# Patient Record
Sex: MALE | Race: WHITE | NOT HISPANIC OR LATINO
[De-identification: names, ages, dates, MRNs, and addresses within clinical notes are randomized per-mention and may not be internally consistent; named-entity substitution may affect disease eponyms.]

---

## 2017-01-09 ENCOUNTER — RESULT CHARGE (OUTPATIENT)
Age: 67
End: 2017-01-09

## 2017-01-10 ENCOUNTER — NON-APPOINTMENT (OUTPATIENT)
Age: 67
End: 2017-01-10

## 2017-01-10 ENCOUNTER — LABORATORY RESULT (OUTPATIENT)
Age: 67
End: 2017-01-10

## 2017-01-10 ENCOUNTER — APPOINTMENT (OUTPATIENT)
Dept: INTERNAL MEDICINE | Facility: CLINIC | Age: 67
End: 2017-01-10

## 2017-01-10 VITALS — WEIGHT: 116 LBS | BODY MASS INDEX: 19.3 KG/M2 | SYSTOLIC BLOOD PRESSURE: 129 MMHG | DIASTOLIC BLOOD PRESSURE: 80 MMHG

## 2017-01-10 VITALS — WEIGHT: 216 LBS | BODY MASS INDEX: 35.94 KG/M2

## 2017-01-10 DIAGNOSIS — R35.0 FREQUENCY OF MICTURITION: ICD-10-CM

## 2017-01-21 ENCOUNTER — APPOINTMENT (OUTPATIENT)
Dept: ORTHOPEDIC SURGERY | Facility: CLINIC | Age: 67
End: 2017-01-21

## 2017-01-21 VITALS
HEART RATE: 96 BPM | HEIGHT: 65 IN | BODY MASS INDEX: 35.99 KG/M2 | DIASTOLIC BLOOD PRESSURE: 95 MMHG | SYSTOLIC BLOOD PRESSURE: 150 MMHG | WEIGHT: 216 LBS

## 2017-01-23 ENCOUNTER — CHART COPY (OUTPATIENT)
Age: 67
End: 2017-01-23

## 2017-01-24 ENCOUNTER — APPOINTMENT (OUTPATIENT)
Dept: INTERNAL MEDICINE | Facility: CLINIC | Age: 67
End: 2017-01-24

## 2017-01-24 LAB
ALBUMIN SERPL ELPH-MCNC: 4.3 G/DL
ALP BLD-CCNC: 163 U/L
ALT SERPL-CCNC: 23 U/L
ANION GAP SERPL CALC-SCNC: 15 MMOL/L
APPEARANCE: CLEAR
AST SERPL-CCNC: 16 U/L
BASOPHILS # BLD AUTO: 0.06 K/UL
BASOPHILS NFR BLD AUTO: 0.7 %
BILIRUB SERPL-MCNC: 0.5 MG/DL
BILIRUBIN URINE: NEGATIVE
BLOOD URINE: NEGATIVE
BUN SERPL-MCNC: 21 MG/DL
CALCIUM SERPL-MCNC: 9.5 MG/DL
CHLORIDE SERPL-SCNC: 106 MMOL/L
CHOLEST SERPL-MCNC: 205 MG/DL
CHOLEST/HDLC SERPL: 5.4 RATIO
CO2 SERPL-SCNC: 23 MMOL/L
COLOR: ABNORMAL
CREAT SERPL-MCNC: 1.2 MG/DL
EOSINOPHIL # BLD AUTO: 0.45 K/UL
EOSINOPHIL NFR BLD AUTO: 5.4 %
GLUCOSE QUALITATIVE U: NORMAL MG/DL
GLUCOSE SERPL-MCNC: 106 MG/DL
HBA1C MFR BLD HPLC: 5.7 %
HCT VFR BLD CALC: 46.6 %
HDLC SERPL-MCNC: 38 MG/DL
HGB BLD-MCNC: 16 G/DL
IMM GRANULOCYTES NFR BLD AUTO: 0.5 %
KETONES URINE: NEGATIVE
LDLC SERPL CALC-MCNC: 132 MG/DL
LEUKOCYTE ESTERASE URINE: NEGATIVE
LYMPHOCYTES # BLD AUTO: 1.57 K/UL
LYMPHOCYTES NFR BLD AUTO: 18.9 %
MAN DIFF?: NORMAL
MCHC RBC-ENTMCNC: 29.5 PG
MCHC RBC-ENTMCNC: 34.3 GM/DL
MCV RBC AUTO: 86 FL
MONOCYTES # BLD AUTO: 1.12 K/UL
MONOCYTES NFR BLD AUTO: 13.5 %
NEUTROPHILS # BLD AUTO: 5.05 K/UL
NEUTROPHILS NFR BLD AUTO: 61 %
NITRITE URINE: NEGATIVE
PH URINE: 5.5
PLATELET # BLD AUTO: 228 K/UL
POTASSIUM SERPL-SCNC: 4.5 MMOL/L
PROT SERPL-MCNC: 6.9 G/DL
PROTEIN URINE: NEGATIVE MG/DL
PSA FREE FLD-MCNC: 28.5 %
PSA FREE SERPL-MCNC: 1.31 NG/ML
PSA SERPL-MCNC: 4.6 NG/ML
RBC # BLD: 5.42 M/UL
RBC # FLD: 13.6 %
SODIUM SERPL-SCNC: 144 MMOL/L
SPECIFIC GRAVITY URINE: 1.03
TRIGL SERPL-MCNC: 176 MG/DL
UROBILINOGEN URINE: NORMAL MG/DL
WBC # FLD AUTO: 8.29 K/UL

## 2017-02-01 ENCOUNTER — APPOINTMENT (OUTPATIENT)
Dept: ORTHOPEDIC SURGERY | Facility: CLINIC | Age: 67
End: 2017-02-01

## 2017-02-01 VITALS
HEART RATE: 93 BPM | HEIGHT: 65 IN | DIASTOLIC BLOOD PRESSURE: 75 MMHG | BODY MASS INDEX: 35.99 KG/M2 | WEIGHT: 216 LBS | SYSTOLIC BLOOD PRESSURE: 118 MMHG

## 2017-02-15 ENCOUNTER — CHART COPY (OUTPATIENT)
Age: 67
End: 2017-02-15

## 2017-02-28 ENCOUNTER — APPOINTMENT (OUTPATIENT)
Dept: INTERNAL MEDICINE | Facility: CLINIC | Age: 67
End: 2017-02-28

## 2017-03-02 ENCOUNTER — OUTPATIENT (OUTPATIENT)
Dept: OUTPATIENT SERVICES | Facility: HOSPITAL | Age: 67
LOS: 1 days | End: 2017-03-02
Payer: MEDICARE

## 2017-03-02 VITALS
HEIGHT: 66 IN | HEART RATE: 85 BPM | RESPIRATION RATE: 14 BRPM | SYSTOLIC BLOOD PRESSURE: 140 MMHG | OXYGEN SATURATION: 95 % | DIASTOLIC BLOOD PRESSURE: 95 MMHG | WEIGHT: 220.02 LBS | TEMPERATURE: 98 F

## 2017-03-02 DIAGNOSIS — Z98.890 OTHER SPECIFIED POSTPROCEDURAL STATES: Chronic | ICD-10-CM

## 2017-03-02 DIAGNOSIS — M51.26 OTHER INTERVERTEBRAL DISC DISPLACEMENT, LUMBAR REGION: ICD-10-CM

## 2017-03-02 DIAGNOSIS — M48.06 SPINAL STENOSIS, LUMBAR REGION: ICD-10-CM

## 2017-03-02 DIAGNOSIS — Z01.818 ENCOUNTER FOR OTHER PREPROCEDURAL EXAMINATION: ICD-10-CM

## 2017-03-02 DIAGNOSIS — Z98.890 OTHER SPECIFIED POSTPROCEDURAL STATES: ICD-10-CM

## 2017-03-02 DIAGNOSIS — M41.9 SCOLIOSIS, UNSPECIFIED: ICD-10-CM

## 2017-03-02 DIAGNOSIS — Z90.89 ACQUIRED ABSENCE OF OTHER ORGANS: Chronic | ICD-10-CM

## 2017-03-02 LAB
ANION GAP SERPL CALC-SCNC: 9 MMOL/L — SIGNIFICANT CHANGE UP (ref 5–17)
APTT BLD: 30.8 SEC — SIGNIFICANT CHANGE UP (ref 27.5–37.4)
BLD GP AB SCN SERPL QL: SIGNIFICANT CHANGE UP
BUN SERPL-MCNC: 20 MG/DL — SIGNIFICANT CHANGE UP (ref 7–23)
CALCIUM SERPL-MCNC: 9.1 MG/DL — SIGNIFICANT CHANGE UP (ref 8.4–10.5)
CHLORIDE SERPL-SCNC: 109 MMOL/L — HIGH (ref 96–108)
CO2 SERPL-SCNC: 26 MMOL/L — SIGNIFICANT CHANGE UP (ref 22–31)
CREAT SERPL-MCNC: 1.03 MG/DL — SIGNIFICANT CHANGE UP (ref 0.5–1.3)
GLUCOSE SERPL-MCNC: 113 MG/DL — HIGH (ref 70–99)
HCT VFR BLD CALC: 48.4 % — SIGNIFICANT CHANGE UP (ref 39–50)
HGB BLD-MCNC: 16.6 G/DL — SIGNIFICANT CHANGE UP (ref 13–17)
INR BLD: 0.98 RATIO — SIGNIFICANT CHANGE UP (ref 0.88–1.16)
MCHC RBC-ENTMCNC: 30 PG — SIGNIFICANT CHANGE UP (ref 27–34)
MCHC RBC-ENTMCNC: 34.2 GM/DL — SIGNIFICANT CHANGE UP (ref 32–36)
MCV RBC AUTO: 87.9 FL — SIGNIFICANT CHANGE UP (ref 80–100)
PLATELET # BLD AUTO: 255 K/UL — SIGNIFICANT CHANGE UP (ref 150–400)
POTASSIUM SERPL-MCNC: 4.2 MMOL/L — SIGNIFICANT CHANGE UP (ref 3.5–5.3)
POTASSIUM SERPL-SCNC: 4.2 MMOL/L — SIGNIFICANT CHANGE UP (ref 3.5–5.3)
PROTHROM AB SERPL-ACNC: 11 SEC — SIGNIFICANT CHANGE UP (ref 10–13.1)
RBC # BLD: 5.51 M/UL — SIGNIFICANT CHANGE UP (ref 4.2–5.8)
RBC # FLD: 12.5 % — SIGNIFICANT CHANGE UP (ref 10.3–14.5)
SODIUM SERPL-SCNC: 144 MMOL/L — SIGNIFICANT CHANGE UP (ref 135–145)
WBC # BLD: 7.4 K/UL — SIGNIFICANT CHANGE UP (ref 3.8–10.5)
WBC # FLD AUTO: 7.4 K/UL — SIGNIFICANT CHANGE UP (ref 3.8–10.5)

## 2017-03-02 PROCEDURE — 86850 RBC ANTIBODY SCREEN: CPT

## 2017-03-02 PROCEDURE — 85610 PROTHROMBIN TIME: CPT

## 2017-03-02 PROCEDURE — 36415 COLL VENOUS BLD VENIPUNCTURE: CPT

## 2017-03-02 PROCEDURE — 86900 BLOOD TYPING SEROLOGIC ABO: CPT

## 2017-03-02 PROCEDURE — G0463: CPT

## 2017-03-02 PROCEDURE — 85027 COMPLETE CBC AUTOMATED: CPT

## 2017-03-02 PROCEDURE — 80048 BASIC METABOLIC PNL TOTAL CA: CPT

## 2017-03-02 PROCEDURE — 93005 ELECTROCARDIOGRAM TRACING: CPT

## 2017-03-02 PROCEDURE — 86901 BLOOD TYPING SEROLOGIC RH(D): CPT

## 2017-03-02 PROCEDURE — 93010 ELECTROCARDIOGRAM REPORT: CPT

## 2017-03-02 PROCEDURE — 85730 THROMBOPLASTIN TIME PARTIAL: CPT

## 2017-03-02 NOTE — H&P PST ADULT - PSH
History of foot surgery  unsure of laterality, >10 ya  History of knee surgery  bilateral > 20 ya  History of shoulder surgery  right, 2006  History of spinal surgery  2014  History of tonsillectomy  childhood

## 2017-03-02 NOTE — H&P PST ADULT - FAMILY HISTORY
Father  Still living? Yes, Estimated age: 81-90  Family history of heart disease, Age at diagnosis: Age Unknown

## 2017-03-02 NOTE — H&P PST ADULT - MUSCULOSKELETAL
details… detailed exam no joint warmth/no joint swelling/decreased ROM due to pain/no joint erythema/no calf tenderness/diminished strength

## 2017-03-02 NOTE — H&P PST ADULT - PMH
Chronic pain    Erectile dysfunction    Herniated lumbar intervertebral disc  L4-5  Hypertension    Obesity (BMI 30-39.9)    Scoliosis    Seasonal allergies    Urinary frequency

## 2017-03-02 NOTE — H&P PST ADULT - PROBLEM SELECTOR PLAN 1
"Transforaminal lumbar interbody fusion at L4-5 and any indicated procedures w/c-arm" on 3/9/17.  patient will obtain medical clearance.  pre op instructions were reviewed and signed.

## 2017-03-02 NOTE — H&P PST ADULT - HISTORY OF PRESENT ILLNESS
66 yo male is scheduled for "Transforaminal lumbar interbody fusion at L4-5 and any indicated procedures" on 3/9/17 with Carlo Salazar MD.  patient complains of "severe" lower back pain with right sided radiation posterior leg and weakness.  Complains of losing sensation and rates pain 10/10.  Severe pain since 8/2016 for which he has tried chiropractic and PT without relief.

## 2017-03-03 NOTE — PROVIDER CONTACT NOTE (OTHER) - ASSESSMENT
The Spine Preoperative Education packet was given to the patient in  the office on 2/1/2017 . Patient and I reviewed the information included in the packet. His question(s) were answered and the patient gave a clear understanding of the instructions. He was advised to call the office at any time with any questions or concerns.

## 2017-03-06 ENCOUNTER — APPOINTMENT (OUTPATIENT)
Dept: INTERNAL MEDICINE | Facility: CLINIC | Age: 67
End: 2017-03-06

## 2017-03-06 VITALS
DIASTOLIC BLOOD PRESSURE: 85 MMHG | WEIGHT: 226 LBS | HEART RATE: 77 BPM | SYSTOLIC BLOOD PRESSURE: 139 MMHG | BODY MASS INDEX: 37.61 KG/M2

## 2017-03-07 RX ORDER — METHYLPREDNISOLONE 4 MG/1
4 TABLET ORAL
Qty: 1 | Refills: 0 | Status: DISCONTINUED | COMMUNITY
Start: 2017-01-21 | End: 2017-03-07

## 2017-03-08 ENCOUNTER — RESULT REVIEW (OUTPATIENT)
Age: 67
End: 2017-03-08

## 2017-03-08 RX ORDER — ONDANSETRON 8 MG/1
4 TABLET, FILM COATED ORAL EVERY 6 HOURS
Qty: 0 | Refills: 0 | Status: DISCONTINUED | OUTPATIENT
Start: 2017-03-09 | End: 2017-03-11

## 2017-03-08 RX ORDER — SENNA PLUS 8.6 MG/1
2 TABLET ORAL AT BEDTIME
Qty: 0 | Refills: 0 | Status: DISCONTINUED | OUTPATIENT
Start: 2017-03-09 | End: 2017-03-11

## 2017-03-08 RX ORDER — DOCUSATE SODIUM 100 MG
100 CAPSULE ORAL THREE TIMES A DAY
Qty: 0 | Refills: 0 | Status: DISCONTINUED | OUTPATIENT
Start: 2017-03-09 | End: 2017-03-11

## 2017-03-08 RX ORDER — MAGNESIUM HYDROXIDE 400 MG/1
30 TABLET, CHEWABLE ORAL EVERY 12 HOURS
Qty: 0 | Refills: 0 | Status: DISCONTINUED | OUTPATIENT
Start: 2017-03-09 | End: 2017-03-11

## 2017-03-08 RX ORDER — SODIUM CHLORIDE 9 MG/ML
1000 INJECTION, SOLUTION INTRAVENOUS
Qty: 0 | Refills: 0 | Status: DISCONTINUED | OUTPATIENT
Start: 2017-03-09 | End: 2017-03-10

## 2017-03-08 NOTE — PATIENT PROFILE ADULT. - TEACHING/LEARNING LEARNING PREFERENCES
written material/pictorial/audio/computer/internet/skill demonstration/group instruction/video/individual instruction/verbal instruction

## 2017-03-09 ENCOUNTER — APPOINTMENT (OUTPATIENT)
Dept: ORTHOPEDIC SURGERY | Facility: HOSPITAL | Age: 67
End: 2017-03-09

## 2017-03-09 ENCOUNTER — TRANSCRIPTION ENCOUNTER (OUTPATIENT)
Age: 67
End: 2017-03-09

## 2017-03-09 ENCOUNTER — INPATIENT (INPATIENT)
Facility: HOSPITAL | Age: 67
LOS: 1 days | Discharge: HOME | DRG: 460 | End: 2017-03-11
Attending: ORTHOPAEDIC SURGERY | Admitting: ORTHOPAEDIC SURGERY
Payer: MEDICARE

## 2017-03-09 VITALS
TEMPERATURE: 98 F | HEIGHT: 66 IN | RESPIRATION RATE: 20 BRPM | SYSTOLIC BLOOD PRESSURE: 142 MMHG | WEIGHT: 220.46 LBS | DIASTOLIC BLOOD PRESSURE: 84 MMHG | HEART RATE: 91 BPM | OXYGEN SATURATION: 97 %

## 2017-03-09 DIAGNOSIS — Z98.890 OTHER SPECIFIED POSTPROCEDURAL STATES: Chronic | ICD-10-CM

## 2017-03-09 DIAGNOSIS — Z98.89 OTHER SPECIFIED POSTPROCEDURAL STATES: ICD-10-CM

## 2017-03-09 DIAGNOSIS — M48.06 SPINAL STENOSIS, LUMBAR REGION: ICD-10-CM

## 2017-03-09 DIAGNOSIS — Z90.89 ACQUIRED ABSENCE OF OTHER ORGANS: Chronic | ICD-10-CM

## 2017-03-09 DIAGNOSIS — Z98.890 OTHER SPECIFIED POSTPROCEDURAL STATES: ICD-10-CM

## 2017-03-09 LAB — ABO RH CONFIRMATION: SIGNIFICANT CHANGE UP

## 2017-03-09 PROCEDURE — 20936 SP BONE AGRFT LOCAL ADD-ON: CPT | Mod: 59

## 2017-03-09 PROCEDURE — 22859 INSJ BIOMECHANICAL DEVICE: CPT

## 2017-03-09 PROCEDURE — 22630 ARTHRD PST TQ 1NTRSPC LUM: CPT | Mod: 59

## 2017-03-09 PROCEDURE — 22630 ARTHRD PST TQ 1NTRSPC LUM: CPT | Mod: 82,59

## 2017-03-09 PROCEDURE — 22840 INSERT SPINE FIXATION DEVICE: CPT | Mod: 82

## 2017-03-09 PROCEDURE — 22840 INSERT SPINE FIXATION DEVICE: CPT

## 2017-03-09 PROCEDURE — 88304 TISSUE EXAM BY PATHOLOGIST: CPT | Mod: 26

## 2017-03-09 PROCEDURE — 22612 ARTHRD PST TQ 1NTRSPC LUMBAR: CPT

## 2017-03-09 PROCEDURE — 22612 ARTHRD PST TQ 1NTRSPC LUMBAR: CPT | Mod: 82

## 2017-03-09 PROCEDURE — 99223 1ST HOSP IP/OBS HIGH 75: CPT

## 2017-03-09 PROCEDURE — 22859 INSJ BIOMECHANICAL DEVICE: CPT | Mod: 82

## 2017-03-09 RX ORDER — INFLUENZA VIRUS VACCINE 15; 15; 15; 15 UG/.5ML; UG/.5ML; UG/.5ML; UG/.5ML
0.5 SUSPENSION INTRAMUSCULAR ONCE
Qty: 0 | Refills: 0 | Status: DISCONTINUED | OUTPATIENT
Start: 2017-03-09 | End: 2017-03-11

## 2017-03-09 RX ORDER — CEFAZOLIN SODIUM 1 G
2000 VIAL (EA) INJECTION ONCE
Qty: 0 | Refills: 0 | Status: COMPLETED | OUTPATIENT
Start: 2017-03-09 | End: 2017-03-09

## 2017-03-09 RX ORDER — AMLODIPINE BESYLATE 2.5 MG/1
5 TABLET ORAL AT BEDTIME
Qty: 0 | Refills: 0 | Status: DISCONTINUED | OUTPATIENT
Start: 2017-03-10 | End: 2017-03-11

## 2017-03-09 RX ORDER — HYDROMORPHONE HYDROCHLORIDE 2 MG/ML
0.5 INJECTION INTRAMUSCULAR; INTRAVENOUS; SUBCUTANEOUS
Qty: 0 | Refills: 0 | Status: DISCONTINUED | OUTPATIENT
Start: 2017-03-09 | End: 2017-03-11

## 2017-03-09 RX ORDER — HYDROMORPHONE HYDROCHLORIDE 2 MG/ML
0.5 INJECTION INTRAMUSCULAR; INTRAVENOUS; SUBCUTANEOUS
Qty: 0 | Refills: 0 | Status: DISCONTINUED | OUTPATIENT
Start: 2017-03-09 | End: 2017-03-09

## 2017-03-09 RX ORDER — ALFUZOSIN HYDROCHLORIDE 10 MG/1
10 TABLET, EXTENDED RELEASE ORAL AT BEDTIME
Qty: 0 | Refills: 0 | Status: DISCONTINUED | OUTPATIENT
Start: 2017-03-09 | End: 2017-03-11

## 2017-03-09 RX ORDER — LORATADINE 10 MG/1
10 TABLET ORAL DAILY
Qty: 0 | Refills: 0 | Status: DISCONTINUED | OUTPATIENT
Start: 2017-03-09 | End: 2017-03-11

## 2017-03-09 RX ORDER — DIPHENHYDRAMINE HCL 50 MG
25 CAPSULE ORAL EVERY 8 HOURS
Qty: 0 | Refills: 0 | Status: DISCONTINUED | OUTPATIENT
Start: 2017-03-09 | End: 2017-03-11

## 2017-03-09 RX ORDER — CEFAZOLIN SODIUM 1 G
2000 VIAL (EA) INJECTION EVERY 8 HOURS
Qty: 0 | Refills: 0 | Status: COMPLETED | OUTPATIENT
Start: 2017-03-09 | End: 2017-03-10

## 2017-03-09 RX ORDER — ACETAMINOPHEN 500 MG
1000 TABLET ORAL EVERY 8 HOURS
Qty: 0 | Refills: 0 | Status: DISCONTINUED | OUTPATIENT
Start: 2017-03-10 | End: 2017-03-11

## 2017-03-09 RX ORDER — ONDANSETRON 8 MG/1
4 TABLET, FILM COATED ORAL EVERY 6 HOURS
Qty: 0 | Refills: 0 | Status: DISCONTINUED | OUTPATIENT
Start: 2017-03-09 | End: 2017-03-10

## 2017-03-09 RX ORDER — LISINOPRIL 2.5 MG/1
20 TABLET ORAL DAILY
Qty: 0 | Refills: 0 | Status: DISCONTINUED | OUTPATIENT
Start: 2017-03-11 | End: 2017-03-11

## 2017-03-09 RX ORDER — ACETAMINOPHEN 500 MG
1000 TABLET ORAL EVERY 6 HOURS
Qty: 0 | Refills: 0 | Status: COMPLETED | OUTPATIENT
Start: 2017-03-09 | End: 2017-03-10

## 2017-03-09 RX ORDER — SODIUM CHLORIDE 9 MG/ML
1000 INJECTION, SOLUTION INTRAVENOUS
Qty: 0 | Refills: 0 | Status: DISCONTINUED | OUTPATIENT
Start: 2017-03-09 | End: 2017-03-09

## 2017-03-09 RX ORDER — HYDROMORPHONE HYDROCHLORIDE 2 MG/ML
30 INJECTION INTRAMUSCULAR; INTRAVENOUS; SUBCUTANEOUS
Qty: 0 | Refills: 0 | Status: DISCONTINUED | OUTPATIENT
Start: 2017-03-09 | End: 2017-03-10

## 2017-03-09 RX ADMIN — Medication 100 MILLIGRAM(S): at 21:15

## 2017-03-09 RX ADMIN — HYDROMORPHONE HYDROCHLORIDE 0.5 MILLIGRAM(S): 2 INJECTION INTRAMUSCULAR; INTRAVENOUS; SUBCUTANEOUS at 13:19

## 2017-03-09 RX ADMIN — Medication 400 MILLIGRAM(S): at 22:27

## 2017-03-09 RX ADMIN — Medication 400 MILLIGRAM(S): at 16:51

## 2017-03-09 RX ADMIN — Medication 100 MILLIGRAM(S): at 16:16

## 2017-03-09 RX ADMIN — HYDROMORPHONE HYDROCHLORIDE 0.5 MILLIGRAM(S): 2 INJECTION INTRAMUSCULAR; INTRAVENOUS; SUBCUTANEOUS at 12:49

## 2017-03-09 RX ADMIN — Medication 1000 MILLIGRAM(S): at 23:00

## 2017-03-09 RX ADMIN — HYDROMORPHONE HYDROCHLORIDE 30 MILLILITER(S): 2 INJECTION INTRAMUSCULAR; INTRAVENOUS; SUBCUTANEOUS at 19:02

## 2017-03-09 RX ADMIN — HYDROMORPHONE HYDROCHLORIDE 30 MILLILITER(S): 2 INJECTION INTRAMUSCULAR; INTRAVENOUS; SUBCUTANEOUS at 13:21

## 2017-03-09 RX ADMIN — SENNA PLUS 2 TABLET(S): 8.6 TABLET ORAL at 21:15

## 2017-03-09 RX ADMIN — HYDROMORPHONE HYDROCHLORIDE 30 MILLILITER(S): 2 INJECTION INTRAMUSCULAR; INTRAVENOUS; SUBCUTANEOUS at 11:51

## 2017-03-09 RX ADMIN — SODIUM CHLORIDE 75 MILLILITER(S): 9 INJECTION, SOLUTION INTRAVENOUS at 11:55

## 2017-03-09 NOTE — OCCUPATIONAL THERAPY INITIAL EVALUATION ADULT - ADL RETRAINING, OT EVAL
Patient will dress lower body with set-up while maintaining spinal precautions, AE as needed within 2-3 sessions.

## 2017-03-09 NOTE — PHYSICAL THERAPY INITIAL EVALUATION ADULT - ACTIVE RANGE OF MOTION EXAMINATION, REHAB EVAL
helen. upper extremity Active ROM was WNL (within normal limits)/bilateral lower extremity Active ROM was WNL (within normal limits)

## 2017-03-09 NOTE — OCCUPATIONAL THERAPY INITIAL EVALUATION ADULT - BED MOBILITY TRAINING, PT EVAL
Pt will perform bed mobility with supervision while using log rolling technique within 2-3 sessions.

## 2017-03-09 NOTE — OCCUPATIONAL THERAPY INITIAL EVALUATION ADULT - RANGE OF MOTION EXAMINATION, UPPER EXTREMITY
Bilateral UE shld flexion  0-90(spinal precautions) elbow-digits WFL's/bilateral UE Active ROM was WFL  (within functional limits)

## 2017-03-09 NOTE — OCCUPATIONAL THERAPY INITIAL EVALUATION ADULT - TRANSFER TRAINING, PT EVAL
Patient will transfer to toilet with  supervision while maintaining spinal precautions DME as needed  2-3 sessions.

## 2017-03-10 ENCOUNTER — TRANSCRIPTION ENCOUNTER (OUTPATIENT)
Age: 67
End: 2017-03-10

## 2017-03-10 LAB
ANION GAP SERPL CALC-SCNC: 7 MMOL/L — SIGNIFICANT CHANGE UP (ref 5–17)
BASOPHILS # BLD AUTO: 0.1 K/UL — SIGNIFICANT CHANGE UP (ref 0–0.2)
BUN SERPL-MCNC: 14 MG/DL — SIGNIFICANT CHANGE UP (ref 7–23)
CALCIUM SERPL-MCNC: 8.8 MG/DL — SIGNIFICANT CHANGE UP (ref 8.4–10.5)
CHLORIDE SERPL-SCNC: 105 MMOL/L — SIGNIFICANT CHANGE UP (ref 96–108)
CO2 SERPL-SCNC: 26 MMOL/L — SIGNIFICANT CHANGE UP (ref 22–31)
CREAT SERPL-MCNC: 1.06 MG/DL — SIGNIFICANT CHANGE UP (ref 0.5–1.3)
EOSINOPHIL # BLD AUTO: 0 K/UL — SIGNIFICANT CHANGE UP (ref 0–0.5)
GLUCOSE SERPL-MCNC: 125 MG/DL — HIGH (ref 70–99)
HCT VFR BLD CALC: 40.2 % — SIGNIFICANT CHANGE UP (ref 39–50)
HGB BLD-MCNC: 14.6 G/DL — SIGNIFICANT CHANGE UP (ref 13–17)
LYMPHOCYTES # BLD AUTO: 1.3 K/UL — SIGNIFICANT CHANGE UP (ref 1–3.3)
LYMPHOCYTES # BLD AUTO: 9 % — LOW (ref 13–44)
MCHC RBC-ENTMCNC: 31.7 PG — SIGNIFICANT CHANGE UP (ref 27–34)
MCHC RBC-ENTMCNC: 36.2 GM/DL — HIGH (ref 32–36)
MCV RBC AUTO: 87.7 FL — SIGNIFICANT CHANGE UP (ref 80–100)
MONOCYTES # BLD AUTO: 1.9 K/UL — HIGH (ref 0–0.9)
MONOCYTES NFR BLD AUTO: 13 % — SIGNIFICANT CHANGE UP (ref 2–14)
NEUTROPHILS # BLD AUTO: 14.4 K/UL — HIGH (ref 1.8–7.4)
NEUTROPHILS NFR BLD AUTO: 78 % — HIGH (ref 43–77)
PLATELET # BLD AUTO: 226 K/UL — SIGNIFICANT CHANGE UP (ref 150–400)
POTASSIUM SERPL-MCNC: 4.7 MMOL/L — SIGNIFICANT CHANGE UP (ref 3.5–5.3)
POTASSIUM SERPL-SCNC: 4.7 MMOL/L — SIGNIFICANT CHANGE UP (ref 3.5–5.3)
RBC # BLD: 4.59 M/UL — SIGNIFICANT CHANGE UP (ref 4.2–5.8)
RBC # FLD: 12.4 % — SIGNIFICANT CHANGE UP (ref 10.3–14.5)
SODIUM SERPL-SCNC: 138 MMOL/L — SIGNIFICANT CHANGE UP (ref 135–145)
SURGICAL PATHOLOGY FINAL REPORT - CH: SIGNIFICANT CHANGE UP
WBC # BLD: 17.6 K/UL — HIGH (ref 3.8–10.5)
WBC # FLD AUTO: 17.6 K/UL — HIGH (ref 3.8–10.5)

## 2017-03-10 PROCEDURE — 99232 SBSQ HOSP IP/OBS MODERATE 35: CPT

## 2017-03-10 RX ORDER — OXYCODONE HYDROCHLORIDE 5 MG/1
10 TABLET ORAL
Qty: 0 | Refills: 0 | Status: DISCONTINUED | OUTPATIENT
Start: 2017-03-10 | End: 2017-03-11

## 2017-03-10 RX ORDER — SODIUM CHLORIDE 9 MG/ML
500 INJECTION INTRAMUSCULAR; INTRAVENOUS; SUBCUTANEOUS ONCE
Qty: 0 | Refills: 0 | Status: COMPLETED | OUTPATIENT
Start: 2017-03-10 | End: 2017-03-10

## 2017-03-10 RX ORDER — OXYCODONE HYDROCHLORIDE 5 MG/1
5 TABLET ORAL
Qty: 0 | Refills: 0 | Status: DISCONTINUED | OUTPATIENT
Start: 2017-03-10 | End: 2017-03-11

## 2017-03-10 RX ADMIN — OXYCODONE HYDROCHLORIDE 10 MILLIGRAM(S): 5 TABLET ORAL at 22:02

## 2017-03-10 RX ADMIN — OXYCODONE HYDROCHLORIDE 10 MILLIGRAM(S): 5 TABLET ORAL at 21:30

## 2017-03-10 RX ADMIN — Medication 100 MILLIGRAM(S): at 05:31

## 2017-03-10 RX ADMIN — OXYCODONE HYDROCHLORIDE 10 MILLIGRAM(S): 5 TABLET ORAL at 17:41

## 2017-03-10 RX ADMIN — SENNA PLUS 2 TABLET(S): 8.6 TABLET ORAL at 21:46

## 2017-03-10 RX ADMIN — SODIUM CHLORIDE 500 MILLILITER(S): 9 INJECTION INTRAMUSCULAR; INTRAVENOUS; SUBCUTANEOUS at 23:44

## 2017-03-10 RX ADMIN — Medication 1000 MILLIGRAM(S): at 09:12

## 2017-03-10 RX ADMIN — OXYCODONE HYDROCHLORIDE 10 MILLIGRAM(S): 5 TABLET ORAL at 12:37

## 2017-03-10 RX ADMIN — Medication 400 MILLIGRAM(S): at 04:18

## 2017-03-10 RX ADMIN — Medication 100 MILLIGRAM(S): at 21:45

## 2017-03-10 RX ADMIN — ALFUZOSIN HYDROCHLORIDE 10 MILLIGRAM(S): 10 TABLET, EXTENDED RELEASE ORAL at 21:40

## 2017-03-10 RX ADMIN — Medication 1000 MILLIGRAM(S): at 17:43

## 2017-03-10 RX ADMIN — Medication 100 MILLIGRAM(S): at 00:20

## 2017-03-10 RX ADMIN — Medication 100 MILLIGRAM(S): at 15:49

## 2017-03-10 RX ADMIN — Medication 1000 MILLIGRAM(S): at 18:42

## 2017-03-10 RX ADMIN — OXYCODONE HYDROCHLORIDE 10 MILLIGRAM(S): 5 TABLET ORAL at 18:11

## 2017-03-10 RX ADMIN — Medication 1000 MILLIGRAM(S): at 04:45

## 2017-03-10 RX ADMIN — OXYCODONE HYDROCHLORIDE 10 MILLIGRAM(S): 5 TABLET ORAL at 13:07

## 2017-03-10 RX ADMIN — Medication 1000 MILLIGRAM(S): at 09:15

## 2017-03-10 NOTE — DISCHARGE NOTE ADULT - PLAN OF CARE
Improve function and quality of life with reduced back and leg pain No heavy lifting. Do not lift more than 10 pounds.  Avoid twisting and bending over. Do NOT drive a car.  You may be driven in a car.  You may shower if the dressing is the clear plastic type or if you cover the existing dressing with plastic and tape to prevent it from getting wet.  Change dressing with dry, sterile gauze and tape if it becomes loose, wet or soiled.    Observe low back precautions as described by the Physical Therapist.  Walking is your best exercise.  It is best not to remain in one position for long periods such as long car rides. Call the doctor with questions, fevers, severe headache, bowel or bladder dysfunction, new numbness/weakness or new pain not relieved by medication.

## 2017-03-10 NOTE — DISCHARGE NOTE ADULT - CARE PROVIDERS DIRECT ADDRESSES
[email or web address]

## 2017-03-10 NOTE — DISCHARGE NOTE ADULT - INSTRUCTIONS
Regular diet with no added salt    For Constipation :   • Increase your daily water intake.   • Try adding fiber to your diet by eating fruits, vegetables and foods that are rich in grains.  • If you do experience constipation, you may take an over-the-counter stool softener/laxative such as  Senokot , Milk of Magnesia or Miralax.

## 2017-03-10 NOTE — DISCHARGE NOTE ADULT - NSFTFSERV1RD_GEN_ALL_CORE
observation and assessment/teaching and training/medication teaching and assessment/wound care and assessment

## 2017-03-10 NOTE — DISCHARGE NOTE ADULT - MEDICATION SUMMARY - MEDICATIONS TO TAKE
I will START or STAY ON the medications listed below when I get home from the hospital:    rolling walker with 5 inch wheels  -- s/p lumbar fusion  -- Indication: For assist with ambulation    3:1 Commode  -- s/p Lumbar fusion  -- Indication: For assist with toileting    Viagra 50 mg oral tablet  -- 1 tab(s) by mouth once a day, As Needed  -- Indication: For ED    acetaminophen 500 mg oral tablet  -- 2 tab(s) by mouth every 8 hours for 2 weeks maximum.  -- Indication: For mild back pain    oxyCODONE 5 mg oral tablet  -- 1 tab(s) by mouth every 4 hours, As Needed, Mild Pain -for moderate pain MDD:6  -- Indication: For moderate to severe back pain    alfuzosin 10 mg oral tablet, extended release  -- 1 tab(s) by mouth once a day  -- Indication: For BPH    diazePAM 5 mg oral tablet  -- 1 tab(s) by mouth every 8 hours, As needed, muscle spasm -for muscle spasm MDD:3  -- Indication: For muscle spasm    amLODIPine-benazepril 5 mg-20 mg oral capsule  -- 1 cap(s) by mouth once a day  -- Indication: For high blood pressure    docusate sodium 100 mg oral capsule  -- 1 cap(s) by mouth 3 times a day  -- Indication: For prevent constipation    Claritin-D 24 Hour oral tablet, extended release  -- 1 tab(s) by mouth once a day  -- Indication: For Seasonal allergies

## 2017-03-10 NOTE — DISCHARGE NOTE ADULT - HOSPITAL COURSE
This 66yo male patient was admitted to [facility] on 3-9-2017 with a history of severe low back pain with radiculopathy due to unstable lumbar stenosis   Patient had appropriate preop medical evaluation and testing.   Patient received antibiotics according to SCIP guidelines for infection prevention.  He underwent an uncomplicated L4-5 fusion by Dr. Carlo Salazar on 3-9.   PAS were applied to his legs for DVT prophylaxis.  Anesthesia, Medical Hospitalist, Physical Therapy and Occupational Therapy were consulted.  Patient is stable for discharge home with a good prognosis on 3-.  Appropriate discharge instructions and medications are provided in this document.

## 2017-03-10 NOTE — DISCHARGE NOTE ADULT - CARE PROVIDER_API CALL
Carlo Salazar (MD), Orthopaedic Surgery  [address] [place] [address]  Phone: [phone]  Fax: [phone]

## 2017-03-10 NOTE — DISCHARGE NOTE ADULT - CARE PLAN
Principal Discharge DX:	Spondylolisthesis at L4-L5 level  Goal:	Improve function and quality of life with reduced back and leg pain  Instructions for follow-up, activity and diet:	No heavy lifting. Do not lift more than 10 pounds.  Avoid twisting and bending over. Do NOT drive a car.  You may be driven in a car.  You may shower if the dressing is the clear plastic type or if you cover the existing dressing with plastic and tape to prevent it from getting wet.  Change dressing with dry, sterile gauze and tape if it becomes loose, wet or soiled.    Observe low back precautions as described by the Physical Therapist.  Walking is your best exercise.  It is best not to remain in one position for long periods such as long car rides. Call the doctor with questions, fevers, severe headache, bowel or bladder dysfunction, new numbness/weakness or new pain not relieved by medication.  Secondary Diagnosis:	Lumbar stenosis

## 2017-03-10 NOTE — DISCHARGE NOTE ADULT - PATIENT PORTAL LINK FT
“You can access the Follow[facility] Patient Portal, offered by [facility], by registering with the following website: [email or web address]”

## 2017-03-10 NOTE — DISCHARGE NOTE ADULT - NS AS ACTIVITY OBS
Stairs allowed/Showering allowed/Walking-Outdoors allowed/Do not drive or operate machinery/No Heavy lifting/straining/may shower if surgical wound dry/Walking-Indoors allowed

## 2017-03-11 VITALS
SYSTOLIC BLOOD PRESSURE: 106 MMHG | TEMPERATURE: 98 F | HEART RATE: 80 BPM | OXYGEN SATURATION: 98 % | DIASTOLIC BLOOD PRESSURE: 70 MMHG | RESPIRATION RATE: 16 BRPM

## 2017-03-11 LAB
ANION GAP SERPL CALC-SCNC: 8 MMOL/L — SIGNIFICANT CHANGE UP (ref 5–17)
BASOPHILS # BLD AUTO: 0.1 K/UL — SIGNIFICANT CHANGE UP (ref 0–0.2)
BASOPHILS NFR BLD AUTO: 1.3 % — SIGNIFICANT CHANGE UP (ref 0–2)
BUN SERPL-MCNC: 20 MG/DL — SIGNIFICANT CHANGE UP (ref 7–23)
CALCIUM SERPL-MCNC: 8.4 MG/DL — SIGNIFICANT CHANGE UP (ref 8.4–10.5)
CHLORIDE SERPL-SCNC: 107 MMOL/L — SIGNIFICANT CHANGE UP (ref 96–108)
CO2 SERPL-SCNC: 26 MMOL/L — SIGNIFICANT CHANGE UP (ref 22–31)
CREAT SERPL-MCNC: 1.27 MG/DL — SIGNIFICANT CHANGE UP (ref 0.5–1.3)
EOSINOPHIL # BLD AUTO: 0.2 K/UL — SIGNIFICANT CHANGE UP (ref 0–0.5)
EOSINOPHIL NFR BLD AUTO: 2.3 % — SIGNIFICANT CHANGE UP (ref 0–6)
GLUCOSE SERPL-MCNC: 133 MG/DL — HIGH (ref 70–99)
HCT VFR BLD CALC: 38.4 % — LOW (ref 39–50)
HGB BLD-MCNC: 13.1 G/DL — SIGNIFICANT CHANGE UP (ref 13–17)
LYMPHOCYTES # BLD AUTO: 1.4 K/UL — SIGNIFICANT CHANGE UP (ref 1–3.3)
LYMPHOCYTES # BLD AUTO: 14.5 % — SIGNIFICANT CHANGE UP (ref 13–44)
MCHC RBC-ENTMCNC: 30.2 PG — SIGNIFICANT CHANGE UP (ref 27–34)
MCHC RBC-ENTMCNC: 34.2 GM/DL — SIGNIFICANT CHANGE UP (ref 32–36)
MCV RBC AUTO: 88.5 FL — SIGNIFICANT CHANGE UP (ref 80–100)
MONOCYTES # BLD AUTO: 1.3 K/UL — HIGH (ref 0–0.9)
MONOCYTES NFR BLD AUTO: 13.2 % — SIGNIFICANT CHANGE UP (ref 2–14)
NEUTROPHILS # BLD AUTO: 6.7 K/UL — SIGNIFICANT CHANGE UP (ref 1.8–7.4)
NEUTROPHILS NFR BLD AUTO: 68.7 % — SIGNIFICANT CHANGE UP (ref 43–77)
PLATELET # BLD AUTO: 163 K/UL — SIGNIFICANT CHANGE UP (ref 150–400)
POTASSIUM SERPL-MCNC: 3.6 MMOL/L — SIGNIFICANT CHANGE UP (ref 3.5–5.3)
POTASSIUM SERPL-SCNC: 3.6 MMOL/L — SIGNIFICANT CHANGE UP (ref 3.5–5.3)
RBC # BLD: 4.33 M/UL — SIGNIFICANT CHANGE UP (ref 4.2–5.8)
RBC # FLD: 12.7 % — SIGNIFICANT CHANGE UP (ref 10.3–14.5)
SODIUM SERPL-SCNC: 141 MMOL/L — SIGNIFICANT CHANGE UP (ref 135–145)
WBC # BLD: 9.8 K/UL — SIGNIFICANT CHANGE UP (ref 3.8–10.5)
WBC # FLD AUTO: 9.8 K/UL — SIGNIFICANT CHANGE UP (ref 3.8–10.5)

## 2017-03-11 PROCEDURE — 99233 SBSQ HOSP IP/OBS HIGH 50: CPT

## 2017-03-11 PROCEDURE — 85027 COMPLETE CBC AUTOMATED: CPT

## 2017-03-11 PROCEDURE — 72100 X-RAY EXAM L-S SPINE 2/3 VWS: CPT

## 2017-03-11 PROCEDURE — 97116 GAIT TRAINING THERAPY: CPT

## 2017-03-11 PROCEDURE — 97110 THERAPEUTIC EXERCISES: CPT

## 2017-03-11 PROCEDURE — C1713: CPT

## 2017-03-11 PROCEDURE — 94664 DEMO&/EVAL PT USE INHALER: CPT

## 2017-03-11 PROCEDURE — 97161 PT EVAL LOW COMPLEX 20 MIN: CPT

## 2017-03-11 PROCEDURE — 76000 FLUOROSCOPY <1 HR PHYS/QHP: CPT

## 2017-03-11 PROCEDURE — 97165 OT EVAL LOW COMPLEX 30 MIN: CPT

## 2017-03-11 PROCEDURE — 72100 X-RAY EXAM L-S SPINE 2/3 VWS: CPT | Mod: 26

## 2017-03-11 PROCEDURE — 97530 THERAPEUTIC ACTIVITIES: CPT

## 2017-03-11 PROCEDURE — C1889: CPT

## 2017-03-11 PROCEDURE — 88304 TISSUE EXAM BY PATHOLOGIST: CPT

## 2017-03-11 PROCEDURE — 97535 SELF CARE MNGMENT TRAINING: CPT

## 2017-03-11 PROCEDURE — 80048 BASIC METABOLIC PNL TOTAL CA: CPT

## 2017-03-11 RX ORDER — DOCUSATE SODIUM 100 MG
1 CAPSULE ORAL
Qty: 30 | Refills: 0 | OUTPATIENT
Start: 2017-03-11

## 2017-03-11 RX ORDER — ACETAMINOPHEN 500 MG
2 TABLET ORAL
Qty: 72 | Refills: 0 | OUTPATIENT
Start: 2017-03-11 | End: 2017-03-23

## 2017-03-11 RX ORDER — OXYCODONE HYDROCHLORIDE 5 MG/1
1 TABLET ORAL
Qty: 72 | Refills: 0 | OUTPATIENT
Start: 2017-03-11 | End: 2017-03-23

## 2017-03-11 RX ORDER — DIAZEPAM 5 MG
1 TABLET ORAL
Qty: 30 | Refills: 0 | OUTPATIENT
Start: 2017-03-11

## 2017-03-11 RX ADMIN — Medication 1000 MILLIGRAM(S): at 02:09

## 2017-03-11 RX ADMIN — OXYCODONE HYDROCHLORIDE 10 MILLIGRAM(S): 5 TABLET ORAL at 02:07

## 2017-03-11 RX ADMIN — Medication 100 MILLIGRAM(S): at 06:02

## 2017-03-11 RX ADMIN — OXYCODONE HYDROCHLORIDE 10 MILLIGRAM(S): 5 TABLET ORAL at 10:52

## 2017-03-11 RX ADMIN — OXYCODONE HYDROCHLORIDE 10 MILLIGRAM(S): 5 TABLET ORAL at 03:00

## 2017-03-11 RX ADMIN — Medication 1000 MILLIGRAM(S): at 02:07

## 2017-03-11 RX ADMIN — OXYCODONE HYDROCHLORIDE 10 MILLIGRAM(S): 5 TABLET ORAL at 10:22

## 2017-03-18 NOTE — OCCUPATIONAL THERAPY INITIAL EVALUATION ADULT - LEVEL OF CONSCIOUSNESS, OT EVAL
alert 40 year old male presenting to the ED complaining of drainage from a suprapubic hematoma. Patient reports that he injured the soft tissue above his genitals x 6 days prior and was seen in the ED. Patient reports he was told to let it heal and follow up with outpatient urology. Urology MD preformed a CT, no results yet. Patient reports that he is presenting today due to the increased drainage from the hematoma as well as pain to the site (as well as during movement). Patient has no other complaints.

## 2017-03-27 ENCOUNTER — APPOINTMENT (OUTPATIENT)
Dept: ORTHOPEDIC SURGERY | Facility: CLINIC | Age: 67
End: 2017-03-27

## 2017-03-27 VITALS
BODY MASS INDEX: 38.15 KG/M2 | HEART RATE: 98 BPM | SYSTOLIC BLOOD PRESSURE: 123 MMHG | WEIGHT: 229 LBS | DIASTOLIC BLOOD PRESSURE: 73 MMHG | HEIGHT: 65 IN

## 2017-03-28 ENCOUNTER — CHART COPY (OUTPATIENT)
Age: 67
End: 2017-03-28

## 2017-04-24 ENCOUNTER — APPOINTMENT (OUTPATIENT)
Dept: ORTHOPEDIC SURGERY | Facility: CLINIC | Age: 67
End: 2017-04-24

## 2017-04-24 VITALS
DIASTOLIC BLOOD PRESSURE: 82 MMHG | HEIGHT: 65 IN | HEART RATE: 89 BPM | BODY MASS INDEX: 38.15 KG/M2 | SYSTOLIC BLOOD PRESSURE: 144 MMHG | WEIGHT: 229 LBS

## 2017-05-01 ENCOUNTER — APPOINTMENT (OUTPATIENT)
Dept: ORTHOPEDIC SURGERY | Facility: CLINIC | Age: 67
End: 2017-05-01

## 2017-06-12 ENCOUNTER — APPOINTMENT (OUTPATIENT)
Dept: ORTHOPEDIC SURGERY | Facility: CLINIC | Age: 67
End: 2017-06-12
Payer: COMMERCIAL

## 2017-06-12 VITALS
WEIGHT: 230 LBS | SYSTOLIC BLOOD PRESSURE: 108 MMHG | HEART RATE: 89 BPM | DIASTOLIC BLOOD PRESSURE: 69 MMHG | BODY MASS INDEX: 38.32 KG/M2 | HEIGHT: 65 IN

## 2017-06-12 PROCEDURE — 72110 X-RAY EXAM L-2 SPINE 4/>VWS: CPT

## 2017-06-12 PROCEDURE — 99024 POSTOP FOLLOW-UP VISIT: CPT

## 2017-06-20 ENCOUNTER — MEDICATION RENEWAL (OUTPATIENT)
Age: 67
End: 2017-06-20

## 2017-06-30 ENCOUNTER — RX RENEWAL (OUTPATIENT)
Age: 67
End: 2017-06-30

## 2017-07-24 ENCOUNTER — APPOINTMENT (OUTPATIENT)
Dept: ORTHOPEDIC SURGERY | Facility: CLINIC | Age: 67
End: 2017-07-24

## 2017-07-24 VITALS
DIASTOLIC BLOOD PRESSURE: 75 MMHG | BODY MASS INDEX: 38.32 KG/M2 | HEART RATE: 101 BPM | HEIGHT: 65 IN | SYSTOLIC BLOOD PRESSURE: 114 MMHG | WEIGHT: 230 LBS

## 2017-09-06 ENCOUNTER — RX RENEWAL (OUTPATIENT)
Age: 67
End: 2017-09-06

## 2017-10-23 ENCOUNTER — APPOINTMENT (OUTPATIENT)
Dept: ORTHOPEDIC SURGERY | Facility: CLINIC | Age: 67
End: 2017-10-23
Payer: COMMERCIAL

## 2017-10-23 VITALS
BODY MASS INDEX: 38.99 KG/M2 | WEIGHT: 234 LBS | SYSTOLIC BLOOD PRESSURE: 118 MMHG | DIASTOLIC BLOOD PRESSURE: 77 MMHG | HEART RATE: 86 BPM | HEIGHT: 65 IN

## 2017-10-23 PROCEDURE — 72110 X-RAY EXAM L-2 SPINE 4/>VWS: CPT

## 2017-10-23 PROCEDURE — 99213 OFFICE O/P EST LOW 20 MIN: CPT

## 2017-12-21 ENCOUNTER — RX RENEWAL (OUTPATIENT)
Age: 67
End: 2017-12-21

## 2018-01-18 ENCOUNTER — RX RENEWAL (OUTPATIENT)
Age: 68
End: 2018-01-18

## 2018-01-22 ENCOUNTER — APPOINTMENT (OUTPATIENT)
Dept: INTERNAL MEDICINE | Facility: CLINIC | Age: 68
End: 2018-01-22
Payer: COMMERCIAL

## 2018-01-22 ENCOUNTER — APPOINTMENT (OUTPATIENT)
Dept: ORTHOPEDIC SURGERY | Facility: CLINIC | Age: 68
End: 2018-01-22
Payer: COMMERCIAL

## 2018-01-22 VITALS
WEIGHT: 232 LBS | HEIGHT: 65.5 IN | DIASTOLIC BLOOD PRESSURE: 88 MMHG | HEART RATE: 91 BPM | SYSTOLIC BLOOD PRESSURE: 139 MMHG | BODY MASS INDEX: 38.19 KG/M2 | OXYGEN SATURATION: 96 %

## 2018-01-22 VITALS
DIASTOLIC BLOOD PRESSURE: 82 MMHG | WEIGHT: 234 LBS | BODY MASS INDEX: 38.99 KG/M2 | HEIGHT: 65 IN | HEART RATE: 96 BPM | SYSTOLIC BLOOD PRESSURE: 133 MMHG

## 2018-01-22 PROCEDURE — G0402 INITIAL PREVENTIVE EXAM: CPT

## 2018-01-22 PROCEDURE — 99213 OFFICE O/P EST LOW 20 MIN: CPT

## 2018-01-22 PROCEDURE — G0439: CPT

## 2018-02-02 ENCOUNTER — MEDICATION RENEWAL (OUTPATIENT)
Age: 68
End: 2018-02-02

## 2018-02-02 LAB
ALBUMIN SERPL ELPH-MCNC: 4.3 G/DL
ALP BLD-CCNC: 143 U/L
ALT SERPL-CCNC: 33 U/L
ANION GAP SERPL CALC-SCNC: 15 MMOL/L
APPEARANCE: CLEAR
AST SERPL-CCNC: 24 U/L
BACTERIA: NEGATIVE
BASOPHILS # BLD AUTO: 0.07 K/UL
BASOPHILS NFR BLD AUTO: 1 %
BILIRUB SERPL-MCNC: 0.4 MG/DL
BILIRUBIN URINE: NEGATIVE
BLOOD URINE: NEGATIVE
BUN SERPL-MCNC: 14 MG/DL
CALCIUM SERPL-MCNC: 9.3 MG/DL
CHLORIDE SERPL-SCNC: 101 MMOL/L
CHOLEST SERPL-MCNC: 191 MG/DL
CHOLEST/HDLC SERPL: 5.2 RATIO
CO2 SERPL-SCNC: 24 MMOL/L
COLOR: ABNORMAL
CREAT SERPL-MCNC: 1.14 MG/DL
EOSINOPHIL # BLD AUTO: 0.25 K/UL
EOSINOPHIL NFR BLD AUTO: 3.6 %
GLUCOSE QUALITATIVE U: NEGATIVE MG/DL
GLUCOSE SERPL-MCNC: 105 MG/DL
HBA1C MFR BLD HPLC: 5.8 %
HCT VFR BLD CALC: 44.4 %
HDLC SERPL-MCNC: 37 MG/DL
HGB BLD-MCNC: 15 G/DL
IMM GRANULOCYTES NFR BLD AUTO: 0.4 %
KETONES URINE: NEGATIVE
LDLC SERPL CALC-MCNC: 124 MG/DL
LEUKOCYTE ESTERASE URINE: NEGATIVE
LYMPHOCYTES # BLD AUTO: 1.54 K/UL
LYMPHOCYTES NFR BLD AUTO: 22.1 %
MAN DIFF?: NORMAL
MCHC RBC-ENTMCNC: 29 PG
MCHC RBC-ENTMCNC: 33.8 GM/DL
MCV RBC AUTO: 85.9 FL
MICROSCOPIC-UA: NORMAL
MONOCYTES # BLD AUTO: 1.02 K/UL
MONOCYTES NFR BLD AUTO: 14.6 %
NEUTROPHILS # BLD AUTO: 4.06 K/UL
NEUTROPHILS NFR BLD AUTO: 58.3 %
NITRITE URINE: NEGATIVE
PH URINE: 6
PLATELET # BLD AUTO: 232 K/UL
POTASSIUM SERPL-SCNC: 4.3 MMOL/L
PROT SERPL-MCNC: 6.7 G/DL
PROTEIN URINE: NEGATIVE MG/DL
PSA SERPL-MCNC: 4.45 NG/ML
RBC # BLD: 5.17 M/UL
RBC # FLD: 14.1 %
RED BLOOD CELLS URINE: 2 /HPF
SODIUM SERPL-SCNC: 140 MMOL/L
SPECIFIC GRAVITY URINE: 1.02
SQUAMOUS EPITHELIAL CELLS: 1 /HPF
TRIGL SERPL-MCNC: 150 MG/DL
TSH SERPL-ACNC: 3.53 UIU/ML
UROBILINOGEN URINE: NEGATIVE MG/DL
WBC # FLD AUTO: 6.97 K/UL
WHITE BLOOD CELLS URINE: 0 /HPF

## 2018-03-12 ENCOUNTER — RX RENEWAL (OUTPATIENT)
Age: 68
End: 2018-03-12

## 2018-03-19 ENCOUNTER — APPOINTMENT (OUTPATIENT)
Dept: ORTHOPEDIC SURGERY | Facility: CLINIC | Age: 68
End: 2018-03-19
Payer: COMMERCIAL

## 2018-03-19 VITALS
HEIGHT: 66 IN | WEIGHT: 232 LBS | DIASTOLIC BLOOD PRESSURE: 71 MMHG | BODY MASS INDEX: 37.28 KG/M2 | HEART RATE: 78 BPM | SYSTOLIC BLOOD PRESSURE: 106 MMHG

## 2018-03-19 PROCEDURE — 99213 OFFICE O/P EST LOW 20 MIN: CPT

## 2018-03-19 PROCEDURE — 72100 X-RAY EXAM L-S SPINE 2/3 VWS: CPT

## 2018-03-19 RX ORDER — MULTIVIT-MIN/FA/LYCOPEN/LUTEIN .4-300-25
TABLET ORAL
Refills: 0 | Status: ACTIVE | COMMUNITY

## 2018-03-19 RX ORDER — LORATADINE 10 MG
TABLET ORAL
Refills: 0 | Status: ACTIVE | COMMUNITY

## 2018-03-30 ENCOUNTER — APPOINTMENT (OUTPATIENT)
Dept: ORTHOPEDIC SURGERY | Facility: CLINIC | Age: 68
End: 2018-03-30

## 2018-04-19 ENCOUNTER — OUTPATIENT (OUTPATIENT)
Dept: OUTPATIENT SERVICES | Facility: HOSPITAL | Age: 68
LOS: 1 days | End: 2018-04-19
Payer: COMMERCIAL

## 2018-04-19 VITALS
WEIGHT: 207.9 LBS | SYSTOLIC BLOOD PRESSURE: 112 MMHG | DIASTOLIC BLOOD PRESSURE: 70 MMHG | HEIGHT: 64.5 IN | HEART RATE: 86 BPM | TEMPERATURE: 98 F | OXYGEN SATURATION: 98 % | RESPIRATION RATE: 16 BRPM

## 2018-04-19 DIAGNOSIS — S43.422A SPRAIN OF LEFT ROTATOR CUFF CAPSULE, INITIAL ENCOUNTER: ICD-10-CM

## 2018-04-19 DIAGNOSIS — I10 ESSENTIAL (PRIMARY) HYPERTENSION: ICD-10-CM

## 2018-04-19 DIAGNOSIS — Z90.89 ACQUIRED ABSENCE OF OTHER ORGANS: Chronic | ICD-10-CM

## 2018-04-19 DIAGNOSIS — Z98.890 OTHER SPECIFIED POSTPROCEDURAL STATES: Chronic | ICD-10-CM

## 2018-04-19 DIAGNOSIS — R06.83 SNORING: ICD-10-CM

## 2018-04-19 LAB
BUN SERPL-MCNC: 14 MG/DL — SIGNIFICANT CHANGE UP (ref 7–23)
CALCIUM SERPL-MCNC: 9.1 MG/DL — SIGNIFICANT CHANGE UP (ref 8.4–10.5)
CHLORIDE SERPL-SCNC: 102 MMOL/L — SIGNIFICANT CHANGE UP (ref 98–107)
CO2 SERPL-SCNC: 26 MMOL/L — SIGNIFICANT CHANGE UP (ref 22–31)
CREAT SERPL-MCNC: 1.06 MG/DL — SIGNIFICANT CHANGE UP (ref 0.5–1.3)
GLUCOSE SERPL-MCNC: 85 MG/DL — SIGNIFICANT CHANGE UP (ref 70–99)
HCT VFR BLD CALC: 43.8 % — SIGNIFICANT CHANGE UP (ref 39–50)
HGB BLD-MCNC: 15 G/DL — SIGNIFICANT CHANGE UP (ref 13–17)
MCHC RBC-ENTMCNC: 29.8 PG — SIGNIFICANT CHANGE UP (ref 27–34)
MCHC RBC-ENTMCNC: 34.2 % — SIGNIFICANT CHANGE UP (ref 32–36)
MCV RBC AUTO: 86.9 FL — SIGNIFICANT CHANGE UP (ref 80–100)
NRBC # FLD: 0 — SIGNIFICANT CHANGE UP
PLATELET # BLD AUTO: 183 K/UL — SIGNIFICANT CHANGE UP (ref 150–400)
PMV BLD: 11 FL — SIGNIFICANT CHANGE UP (ref 7–13)
POTASSIUM SERPL-MCNC: 3.7 MMOL/L — SIGNIFICANT CHANGE UP (ref 3.5–5.3)
POTASSIUM SERPL-SCNC: 3.7 MMOL/L — SIGNIFICANT CHANGE UP (ref 3.5–5.3)
RBC # BLD: 5.04 M/UL — SIGNIFICANT CHANGE UP (ref 4.2–5.8)
RBC # FLD: 12.9 % — SIGNIFICANT CHANGE UP (ref 10.3–14.5)
SODIUM SERPL-SCNC: 141 MMOL/L — SIGNIFICANT CHANGE UP (ref 135–145)
WBC # BLD: 6.06 K/UL — SIGNIFICANT CHANGE UP (ref 3.8–10.5)
WBC # FLD AUTO: 6.06 K/UL — SIGNIFICANT CHANGE UP (ref 3.8–10.5)

## 2018-04-19 PROCEDURE — 93010 ELECTROCARDIOGRAM REPORT: CPT

## 2018-04-19 RX ORDER — LORATADINE, PSEUDOEPHEDRINE SULFATE 5; 120 MG/1; MG/1
1 TABLET, FILM COATED, EXTENDED RELEASE ORAL
Qty: 0 | Refills: 0 | COMMUNITY

## 2018-04-19 RX ORDER — ALFUZOSIN HYDROCHLORIDE 10 MG/1
1 TABLET, EXTENDED RELEASE ORAL
Qty: 0 | Refills: 0 | COMMUNITY

## 2018-04-19 NOTE — H&P PST ADULT - PROBLEM SELECTOR PLAN 1
Pt scheduled for left shoulder arthroscopy, rotator cuff repair on 05/03/18  Preop instructions provided. Pt verbalized understanding.   Pepcid for GI prophylaxis provided   Chlorhexidine wash with instructions provided.   pt has hortencia with PCP  on 04/23/18, copy requested

## 2018-04-19 NOTE — H&P PST ADULT - PMH
Chronic pain    Erectile dysfunction    Herniated lumbar intervertebral disc  L4-5  Hypertension    Obesity (BMI 30-39.9)    Scoliosis    Seasonal allergies    Urinary frequency Chronic pain    Erectile dysfunction    Herniated lumbar intervertebral disc  L4-5  Hypertension    Obesity (BMI 30-39.9)    Scoliosis    Seasonal allergies    Sprain of left rotator cuff capsule, initial encounter    Urinary frequency

## 2018-04-19 NOTE — H&P PST ADULT - NEGATIVE GENERAL GENITOURINARY SYMPTOMS
hx of BPH/no nocturia/no flank pain L/no dysuria/normal urinary frequency/no flank pain R/no renal colic

## 2018-04-19 NOTE — H&P PST ADULT - PSH
History of foot surgery  unsure of laterality, >10 ya  History of knee surgery  bilateral > 20 ya  History of shoulder surgery  right, 2006  History of spinal surgery  2014 2017 lumbar fusion  History of tonsillectomy  childhood

## 2018-04-19 NOTE — H&P PST ADULT - NEGATIVE ENMT SYMPTOMS
no ear pain/no nasal congestion/no post-nasal discharge/no nasal discharge/no throat pain/no dysphagia/no abnormal taste sensation

## 2018-04-19 NOTE — H&P PST ADULT - VISION (WITH CORRECTIVE LENSES IF THE PATIENT USUALLY WEARS THEM):
Normal vision: sees adequately in most situations; can see medication labels, newsprint/glasses reading

## 2018-04-19 NOTE — H&P PST ADULT - HISTORY OF PRESENT ILLNESS
67 yo male wiht hx of HTN, left shoulder pain " for many years", reports pain got worse since ~3 weeks, pain constant 10/10, sharp, radiating to left arm, c/o numbness in left hand fingers. Pt presents to PST for evaluation for Arthroscopy on 05/03/18 67 yo male with hx of HTN, left shoulder pain " for many years", reports pain got worse since ~3 weeks ago, pain constant 10/10, sharp, radiating to left arm, c/o numbness in left hand fingers. Pt presents to PST for evaluation for Left Shoulder Arthroscopy, Rotator Cuff Repair on 05/03/18

## 2018-04-23 ENCOUNTER — APPOINTMENT (OUTPATIENT)
Dept: INTERNAL MEDICINE | Facility: CLINIC | Age: 68
End: 2018-04-23
Payer: COMMERCIAL

## 2018-04-23 VITALS
BODY MASS INDEX: 33.09 KG/M2 | HEART RATE: 92 BPM | WEIGHT: 205 LBS | SYSTOLIC BLOOD PRESSURE: 110 MMHG | OXYGEN SATURATION: 97 % | DIASTOLIC BLOOD PRESSURE: 70 MMHG

## 2018-04-23 DIAGNOSIS — M75.122 COMPLETE ROTATOR CUFF TEAR OR RUPTURE OF LEFT SHOULDER, NOT SPECIFIED AS TRAUMATIC: ICD-10-CM

## 2018-04-23 PROCEDURE — 99214 OFFICE O/P EST MOD 30 MIN: CPT

## 2018-04-27 PROBLEM — M75.122 COMPLETE TEAR OF LEFT ROTATOR CUFF: Status: ACTIVE | Noted: 2018-04-27

## 2018-05-03 ENCOUNTER — TRANSCRIPTION ENCOUNTER (OUTPATIENT)
Age: 68
End: 2018-05-03

## 2018-05-03 ENCOUNTER — OUTPATIENT (OUTPATIENT)
Dept: OUTPATIENT SERVICES | Facility: HOSPITAL | Age: 68
LOS: 1 days | Discharge: HOME | End: 2018-05-03

## 2018-05-03 VITALS
DIASTOLIC BLOOD PRESSURE: 69 MMHG | HEIGHT: 64.5 IN | TEMPERATURE: 98 F | HEART RATE: 85 BPM | WEIGHT: 207.9 LBS | RESPIRATION RATE: 16 BRPM | SYSTOLIC BLOOD PRESSURE: 114 MMHG | OXYGEN SATURATION: 98 %

## 2018-05-03 VITALS
SYSTOLIC BLOOD PRESSURE: 97 MMHG | DIASTOLIC BLOOD PRESSURE: 50 MMHG | OXYGEN SATURATION: 98 % | RESPIRATION RATE: 14 BRPM | HEART RATE: 94 BPM

## 2018-05-03 DIAGNOSIS — Z90.89 ACQUIRED ABSENCE OF OTHER ORGANS: Chronic | ICD-10-CM

## 2018-05-03 DIAGNOSIS — Z98.890 OTHER SPECIFIED POSTPROCEDURAL STATES: Chronic | ICD-10-CM

## 2018-05-03 DIAGNOSIS — S43.422A SPRAIN OF LEFT ROTATOR CUFF CAPSULE, INITIAL ENCOUNTER: ICD-10-CM

## 2018-05-03 RX ORDER — ONDANSETRON 8 MG/1
1 TABLET, FILM COATED ORAL
Qty: 16 | Refills: 0
Start: 2018-05-03 | End: 2018-05-06

## 2018-05-03 NOTE — ASU DISCHARGE PLAN (ADULT/PEDIATRIC). - MEDICATION SUMMARY - MEDICATIONS TO TAKE
I will START or STAY ON the medications listed below when I get home from the hospital:    see medication reconciliation  -- Indication: For home med    Percocet 5/325 oral tablet  -- 1-2 tab(s) by mouth every 6 hours, As Needed -for moderate pain MDD:8 tabs  -- Caution federal law prohibits the transfer of this drug to any person other  than the person for whom it was prescribed.  May cause drowsiness.  Alcohol may intensify this effect.  Use care when operating dangerous machinery.  This prescription cannot be refilled.  This product contains acetaminophen.  Do not use  with any other product containing acetaminophen to prevent possible liver damage.  Using more of this medication than prescribed may cause serious breathing problems.    -- Indication: For pain med    Zofran 4 mg oral tablet  -- 1 tab(s) by mouth every 6 hours, As Needed for nausea  -- Indication: For nausea

## 2018-05-03 NOTE — BRIEF OPERATIVE NOTE - PROCEDURE
<<-----Click on this checkbox to enter Procedure Subacromial decompression  05/03/2018    Active  PGOLD2  Rotator cuff repair  05/03/2018    Active  PGOLD2

## 2018-05-03 NOTE — ASU DISCHARGE PLAN (ADULT/PEDIATRIC). - NOTIFY
Bleeding that does not stop/see instruction sheet/Fever greater than 101 Fever greater than 101/Bleeding that does not stop/Persistent Nausea and Vomiting/Numbness, color, or temperature change to extremity/Pain not relieved by Medications

## 2018-06-16 ENCOUNTER — APPOINTMENT (OUTPATIENT)
Dept: ORTHOPEDIC SURGERY | Facility: CLINIC | Age: 68
End: 2018-06-16
Payer: COMMERCIAL

## 2018-06-16 VITALS — WEIGHT: 191 LBS | HEIGHT: 66 IN | BODY MASS INDEX: 30.7 KG/M2

## 2018-06-16 PROCEDURE — 99214 OFFICE O/P EST MOD 30 MIN: CPT

## 2018-07-02 ENCOUNTER — RX RENEWAL (OUTPATIENT)
Age: 68
End: 2018-07-02

## 2018-07-05 ENCOUNTER — MEDICATION RENEWAL (OUTPATIENT)
Age: 68
End: 2018-07-05

## 2018-07-06 ENCOUNTER — RX RENEWAL (OUTPATIENT)
Age: 68
End: 2018-07-06

## 2018-08-01 ENCOUNTER — RX RENEWAL (OUTPATIENT)
Age: 68
End: 2018-08-01

## 2018-09-24 ENCOUNTER — RX RENEWAL (OUTPATIENT)
Age: 68
End: 2018-09-24

## 2018-10-12 ENCOUNTER — RX RENEWAL (OUTPATIENT)
Age: 68
End: 2018-10-12

## 2018-11-29 PROBLEM — I10 ESSENTIAL (PRIMARY) HYPERTENSION: Chronic | Status: ACTIVE | Noted: 2017-03-02

## 2018-11-29 PROBLEM — M41.9 SCOLIOSIS, UNSPECIFIED: Chronic | Status: ACTIVE | Noted: 2017-03-02

## 2018-11-29 PROBLEM — J30.2 OTHER SEASONAL ALLERGIC RHINITIS: Chronic | Status: ACTIVE | Noted: 2017-03-02

## 2018-11-29 PROBLEM — E66.9 OBESITY, UNSPECIFIED: Chronic | Status: ACTIVE | Noted: 2017-03-02

## 2018-11-29 PROBLEM — N52.9 MALE ERECTILE DYSFUNCTION, UNSPECIFIED: Chronic | Status: ACTIVE | Noted: 2017-03-02

## 2018-11-29 PROBLEM — R35.0 FREQUENCY OF MICTURITION: Chronic | Status: ACTIVE | Noted: 2017-03-02

## 2018-12-28 ENCOUNTER — RX RENEWAL (OUTPATIENT)
Age: 68
End: 2018-12-28

## 2019-01-23 ENCOUNTER — APPOINTMENT (OUTPATIENT)
Dept: INTERNAL MEDICINE | Facility: CLINIC | Age: 69
End: 2019-01-23
Payer: COMMERCIAL

## 2019-01-23 VITALS
OXYGEN SATURATION: 97 % | SYSTOLIC BLOOD PRESSURE: 120 MMHG | DIASTOLIC BLOOD PRESSURE: 70 MMHG | HEIGHT: 66 IN | WEIGHT: 207 LBS | HEART RATE: 86 BPM | BODY MASS INDEX: 33.27 KG/M2

## 2019-01-23 DIAGNOSIS — Z82.49 FAMILY HISTORY OF ISCHEMIC HEART DISEASE AND OTHER DISEASES OF THE CIRCULATORY SYSTEM: ICD-10-CM

## 2019-01-23 DIAGNOSIS — K21.0 GASTRO-ESOPHAGEAL REFLUX DISEASE WITH ESOPHAGITIS: ICD-10-CM

## 2019-01-23 DIAGNOSIS — Z82.5 FAMILY HISTORY OF ASTHMA AND OTHER CHRONIC LOWER RESPIRATORY DISEASES: ICD-10-CM

## 2019-01-23 PROCEDURE — G0439: CPT

## 2019-01-23 PROCEDURE — 93000 ELECTROCARDIOGRAM COMPLETE: CPT

## 2019-01-23 RX ORDER — OMEPRAZOLE 20 MG/1
20 CAPSULE, DELAYED RELEASE ORAL
Qty: 30 | Refills: 1 | Status: ACTIVE | COMMUNITY
Start: 2019-01-23 | End: 1900-01-01

## 2019-01-29 PROBLEM — Z82.5 FAMILY HISTORY OF CHRONIC OBSTRUCTIVE PULMONARY DISEASE: Status: ACTIVE | Noted: 2019-01-29

## 2019-01-29 PROBLEM — Z82.49 FAMILY HISTORY OF MYOCARDIAL INFARCTION: Status: ACTIVE | Noted: 2019-01-29

## 2019-01-29 NOTE — PLAN
[FreeTextEntry1] : Pt doing much better with respect to spine sx and this has had favorable impact upon exercise, weight.  eating schedule remains irregular though nature of food is somewhat improved from prior with respect to metabolic considerations.  Still pattern may favor GERD as does recent sx. would use 4-6 weeks ppi and reassess. ekg nl again today and tolerates exertion without issue.  he is not interested in cardiology evaluation at present but we will discuss again if sx do not thea upon upcoming tx regimen. Timed voiding to avoid excessive urinary urgency with large prostate and prostatism sx. checking psa today along with reg bloods.  refuses colonoscopy and vaccinations.  guaiac negative.

## 2019-01-29 NOTE — HISTORY OF PRESENT ILLNESS
[FreeTextEntry1] : annual [de-identified] : Pt is doing better with respect to back.  Back to playing paddleball.  NOt finding as much time to walk as he would like however -- work hours have been long.  He is trying to keep weight in check though and has been able to do reasonable job of this.  Had one episode recently of burning at central chest - without shortness of breath, diaphoresis, nausea, dizziness, lightheadedness, radiation. went to [facility] urgent care and was examined, ekg checked - all wnl.  has had no similar such episodes since and no difficulty with exertion.  has however noted a bit of acid taste in mouth, sx which may emerge after lying down at night. diet a bit irregular and contains many oily/acidic elements.  has tried no antacids.  He is taking Alfuzosin and Finasteride regularly.  has had more nocturia and frequency however.   refuses colonoscopy and vaccinations.

## 2019-01-29 NOTE — HEALTH RISK ASSESSMENT
[Very Good] : ~his/her~  mood as very good [] : No [No falls in past year] : Patient reported no falls in the past year [0] : 2) Feeling down, depressed, or hopeless: Not at all (0) [de-identified] : ortho [[id number]] : 0 [Change in mental status noted] : No change in mental status noted [Language] : denies difficulty with language [Behavior] : denies difficulty with behavior [Learning/Retaining New Information] : denies difficulty learning/retaining new information [Handling Complex Tasks] : denies difficulty handling complex tasks [Reasoning] : denies difficulty with reasoning [Spatial Ability and Orientation] : denies difficulty with spatial ability and orientation [None] : None [With Family] : lives with family [Employed] : employed [] :  [# Of Children ___] : has [unfilled] children [Feels Safe at Home] : Feels safe at home [Fully functional (bathing, dressing, toileting, transferring, walking, feeding)] : Fully functional (bathing, dressing, toileting, transferring, walking, feeding) [Fully functional (using the telephone, shopping, preparing meals, housekeeping, doing laundry, using] : Fully functional and needs no help or supervision to perform IADLs (using the telephone, shopping, preparing meals, housekeeping, doing laundry, using transportation, managing medications and managing finances) [Reports changes in hearing] : Reports no changes in hearing [Reports changes in vision] : Reports no changes in vision [Reports changes in dental health] : Reports no changes in dental health [FreeTextEntry2] : a/r

## 2019-01-29 NOTE — PHYSICAL EXAM

## 2019-01-31 ENCOUNTER — MEDICATION RENEWAL (OUTPATIENT)
Age: 69
End: 2019-01-31

## 2019-07-14 LAB
ALBUMIN SERPL ELPH-MCNC: 4.2 G/DL
ALP BLD-CCNC: 132 U/L
ALT SERPL-CCNC: 23 U/L
ANION GAP SERPL CALC-SCNC: 12 MMOL/L
AST SERPL-CCNC: 25 U/L
BASOPHILS # BLD AUTO: 0.05 K/UL
BASOPHILS NFR BLD AUTO: 0.8 %
BILIRUB SERPL-MCNC: 0.8 MG/DL
BUN SERPL-MCNC: 9 MG/DL
CALCIUM SERPL-MCNC: 9.2 MG/DL
CHLORIDE SERPL-SCNC: 104 MMOL/L
CHOLEST SERPL-MCNC: 96 MG/DL
CHOLEST/HDLC SERPL: 2.1 RATIO
CO2 SERPL-SCNC: 26 MMOL/L
CREAT SERPL-MCNC: 0.93 MG/DL
EOSINOPHIL # BLD AUTO: 0.27 K/UL
EOSINOPHIL NFR BLD AUTO: 4.1 %
GLUCOSE SERPL-MCNC: 95 MG/DL
HBA1C MFR BLD HPLC: 5.6 %
HCT VFR BLD CALC: 45 %
HDLC SERPL-MCNC: 45 MG/DL
HGB BLD-MCNC: 15.6 G/DL
IMM GRANULOCYTES NFR BLD AUTO: 0.2 %
LDLC SERPL CALC-MCNC: 36 MG/DL
LYMPHOCYTES # BLD AUTO: 1.5 K/UL
LYMPHOCYTES NFR BLD AUTO: 22.7 %
MAN DIFF?: NORMAL
MCHC RBC-ENTMCNC: 30.2 PG
MCHC RBC-ENTMCNC: 34.7 GM/DL
MCV RBC AUTO: 87 FL
MONOCYTES # BLD AUTO: 0.8 K/UL
MONOCYTES NFR BLD AUTO: 12.1 %
NEUTROPHILS # BLD AUTO: 3.99 K/UL
NEUTROPHILS NFR BLD AUTO: 60.1 %
PLATELET # BLD AUTO: 208 K/UL
POTASSIUM SERPL-SCNC: 4.2 MMOL/L
PROT SERPL-MCNC: 6.9 G/DL
PSA SERPL-MCNC: 2.73 NG/ML
RBC # BLD: 5.17 M/UL
RBC # FLD: 13.5 %
SODIUM SERPL-SCNC: 142 MMOL/L
TRIGL SERPL-MCNC: 76 MG/DL
WBC # FLD AUTO: 6.62 K/UL

## 2019-07-15 ENCOUNTER — RX RENEWAL (OUTPATIENT)
Age: 69
End: 2019-07-15

## 2019-07-25 ENCOUNTER — RX RENEWAL (OUTPATIENT)
Age: 69
End: 2019-07-25

## 2019-08-14 ENCOUNTER — APPOINTMENT (OUTPATIENT)
Dept: ORTHOPEDIC SURGERY | Facility: CLINIC | Age: 69
End: 2019-08-14
Payer: COMMERCIAL

## 2019-08-14 VITALS — WEIGHT: 207 LBS | BODY MASS INDEX: 33.27 KG/M2 | HEIGHT: 66 IN

## 2019-08-14 PROCEDURE — 99214 OFFICE O/P EST MOD 30 MIN: CPT

## 2019-08-14 PROCEDURE — 72110 X-RAY EXAM L-2 SPINE 4/>VWS: CPT

## 2019-08-26 ENCOUNTER — APPOINTMENT (OUTPATIENT)
Dept: ORTHOPEDIC SURGERY | Facility: CLINIC | Age: 69
End: 2019-08-26
Payer: COMMERCIAL

## 2019-08-26 VITALS
DIASTOLIC BLOOD PRESSURE: 86 MMHG | SYSTOLIC BLOOD PRESSURE: 135 MMHG | HEIGHT: 66 IN | HEART RATE: 93 BPM | BODY MASS INDEX: 33.27 KG/M2 | WEIGHT: 207 LBS

## 2019-08-26 PROCEDURE — 99214 OFFICE O/P EST MOD 30 MIN: CPT

## 2019-08-28 ENCOUNTER — APPOINTMENT (OUTPATIENT)
Dept: INTERNAL MEDICINE | Facility: CLINIC | Age: 69
End: 2019-08-28
Payer: COMMERCIAL

## 2019-08-28 VITALS
BODY MASS INDEX: 33.41 KG/M2 | OXYGEN SATURATION: 95 % | HEART RATE: 89 BPM | DIASTOLIC BLOOD PRESSURE: 90 MMHG | WEIGHT: 207 LBS | SYSTOLIC BLOOD PRESSURE: 144 MMHG

## 2019-08-28 PROCEDURE — 99214 OFFICE O/P EST MOD 30 MIN: CPT

## 2019-08-28 RX ORDER — IBUPROFEN 200 MG/1
TABLET, COATED ORAL
Refills: 0 | Status: DISCONTINUED | COMMUNITY
End: 2019-08-28

## 2019-08-28 RX ORDER — OXYCODONE 10 MG/1
10 TABLET ORAL
Qty: 40 | Refills: 0 | Status: DISCONTINUED | COMMUNITY
Start: 2017-03-28 | End: 2019-08-28

## 2019-08-29 ENCOUNTER — OUTPATIENT (OUTPATIENT)
Dept: OUTPATIENT SERVICES | Facility: HOSPITAL | Age: 69
LOS: 1 days | End: 2019-08-29
Payer: COMMERCIAL

## 2019-08-29 VITALS
RESPIRATION RATE: 14 BRPM | SYSTOLIC BLOOD PRESSURE: 129 MMHG | OXYGEN SATURATION: 99 % | WEIGHT: 229.28 LBS | HEIGHT: 66 IN | HEART RATE: 98 BPM | DIASTOLIC BLOOD PRESSURE: 70 MMHG | TEMPERATURE: 98 F

## 2019-08-29 DIAGNOSIS — Z90.89 ACQUIRED ABSENCE OF OTHER ORGANS: Chronic | ICD-10-CM

## 2019-08-29 DIAGNOSIS — Z98.890 OTHER SPECIFIED POSTPROCEDURAL STATES: Chronic | ICD-10-CM

## 2019-08-29 DIAGNOSIS — M48.061 SPINAL STENOSIS, LUMBAR REGION WITHOUT NEUROGENIC CLAUDICATION: ICD-10-CM

## 2019-08-29 DIAGNOSIS — M51.26 OTHER INTERVERTEBRAL DISC DISPLACEMENT, LUMBAR REGION: ICD-10-CM

## 2019-08-29 DIAGNOSIS — Z01.818 ENCOUNTER FOR OTHER PREPROCEDURAL EXAMINATION: ICD-10-CM

## 2019-08-29 DIAGNOSIS — M54.16 RADICULOPATHY, LUMBAR REGION: ICD-10-CM

## 2019-08-29 LAB
ALBUMIN SERPL ELPH-MCNC: 3.6 G/DL — SIGNIFICANT CHANGE UP (ref 3.3–5)
ALP SERPL-CCNC: 177 U/L — HIGH (ref 30–120)
ALT FLD-CCNC: 46 U/L DA — SIGNIFICANT CHANGE UP (ref 10–60)
ANION GAP SERPL CALC-SCNC: 8 MMOL/L — SIGNIFICANT CHANGE UP (ref 5–17)
APTT BLD: 29 SEC — SIGNIFICANT CHANGE UP (ref 28.5–37)
AST SERPL-CCNC: 29 U/L — SIGNIFICANT CHANGE UP (ref 10–40)
BILIRUB SERPL-MCNC: 0.4 MG/DL — SIGNIFICANT CHANGE UP (ref 0.2–1.2)
BLD GP AB SCN SERPL QL: SIGNIFICANT CHANGE UP
BUN SERPL-MCNC: 27 MG/DL — HIGH (ref 7–23)
CALCIUM SERPL-MCNC: 9.1 MG/DL — SIGNIFICANT CHANGE UP (ref 8.4–10.5)
CHLORIDE SERPL-SCNC: 108 MMOL/L — SIGNIFICANT CHANGE UP (ref 96–108)
CO2 SERPL-SCNC: 24 MMOL/L — SIGNIFICANT CHANGE UP (ref 22–31)
CREAT SERPL-MCNC: 1.05 MG/DL — SIGNIFICANT CHANGE UP (ref 0.5–1.3)
GLUCOSE SERPL-MCNC: 162 MG/DL — HIGH (ref 70–99)
HCT VFR BLD CALC: 44.3 % — SIGNIFICANT CHANGE UP (ref 39–50)
HGB BLD-MCNC: 15.4 G/DL — SIGNIFICANT CHANGE UP (ref 13–17)
INR BLD: 0.9 RATIO — SIGNIFICANT CHANGE UP (ref 0.88–1.16)
MCHC RBC-ENTMCNC: 30.1 PG — SIGNIFICANT CHANGE UP (ref 27–34)
MCHC RBC-ENTMCNC: 34.8 GM/DL — SIGNIFICANT CHANGE UP (ref 32–36)
MCV RBC AUTO: 86.5 FL — SIGNIFICANT CHANGE UP (ref 80–100)
NRBC # BLD: 0 /100 WBCS — SIGNIFICANT CHANGE UP (ref 0–0)
PLATELET # BLD AUTO: 212 K/UL — SIGNIFICANT CHANGE UP (ref 150–400)
POTASSIUM SERPL-MCNC: 3.9 MMOL/L — SIGNIFICANT CHANGE UP (ref 3.5–5.3)
POTASSIUM SERPL-SCNC: 3.9 MMOL/L — SIGNIFICANT CHANGE UP (ref 3.5–5.3)
PROT SERPL-MCNC: 6.9 G/DL — SIGNIFICANT CHANGE UP (ref 6–8.3)
PROTHROM AB SERPL-ACNC: 9.8 SEC — LOW (ref 10–12.9)
RBC # BLD: 5.12 M/UL — SIGNIFICANT CHANGE UP (ref 4.2–5.8)
RBC # FLD: 12.6 % — SIGNIFICANT CHANGE UP (ref 10.3–14.5)
SODIUM SERPL-SCNC: 140 MMOL/L — SIGNIFICANT CHANGE UP (ref 135–145)
WBC # BLD: 6.81 K/UL — SIGNIFICANT CHANGE UP (ref 3.8–10.5)
WBC # FLD AUTO: 6.81 K/UL — SIGNIFICANT CHANGE UP (ref 3.8–10.5)

## 2019-08-29 PROCEDURE — 93005 ELECTROCARDIOGRAM TRACING: CPT

## 2019-08-29 PROCEDURE — G0463: CPT

## 2019-08-29 PROCEDURE — 93010 ELECTROCARDIOGRAM REPORT: CPT

## 2019-08-29 NOTE — H&P PST ADULT - MUSCULOSKELETAL
detailed exam decreased ROM due to pain details… diminished strength/no calf tenderness/decreased ROM due to pain

## 2019-08-29 NOTE — H&P PST ADULT - NSICDXPASTMEDICALHX_GEN_ALL_CORE_FT
PAST MEDICAL HISTORY:  Chronic pain     Erectile dysfunction     Herniated lumbar intervertebral disc L4-5    Hypertension     Obesity (BMI 30-39.9)     Scoliosis     Seasonal allergies     Urinary frequency PAST MEDICAL HISTORY:  Chronic pain     Erectile dysfunction     Herniated lumbar intervertebral disc L5-S1    Hypertension     Obesity (BMI 30-39.9)     Scoliosis     Seasonal allergies     Urinary frequency

## 2019-08-29 NOTE — H&P PST ADULT - NSICDXPASTSURGICALHX_GEN_ALL_CORE_FT
PAST SURGICAL HISTORY:  History of foot surgery unsure of laterality, >10 ya    History of knee surgery bilateral > 20 ya    History of shoulder surgery right, 2006    History of spinal surgery 2014 2017 lumbar fusion    History of tonsillectomy childhood    S/P arthroscopy of left shoulder 2018

## 2019-08-29 NOTE — H&P PST ADULT - NSICDXPROBLEM_GEN_ALL_CORE_FT
PROBLEM DIAGNOSES  Problem: Lumbar radiculopathy  Assessment and Plan: microdiscectomy laminectomy L5S1  Medical clearance   Pre op instructions

## 2019-08-29 NOTE — PROVIDER CONTACT NOTE (OTHER) - ASSESSMENT
The Spine Pre-Operative Education packet was given to the patient on 8/26/19. The patient and NP reviewed the information included in the packet. All his questions were answered and he gave a clear understanding of the instructions. He was advised to call the office at any time with further questions or concerns.

## 2019-08-29 NOTE — H&P PST ADULT - PRIMARY CARE PROVIDER
Yontahan  [phone], pt has appt on 04/23/18 for med. evaluation [facility]  [phone],

## 2019-08-29 NOTE — H&P PST ADULT - NSICDXFAMILYHX_GEN_ALL_CORE_FT
FAMILY HISTORY:  Father  Still living? Yes, Estimated age: 81-90  Family history of heart disease, Age at diagnosis: Age Unknown

## 2019-08-29 NOTE — H&P PST ADULT - ASSESSMENT
68 y.o. male with preop diagnosis of Sprain of left rotator cuff capsule, initial encounter 68 y/o male with lumbar radiculopathy

## 2019-08-29 NOTE — H&P PST ADULT - ANESTHESIA, PREVIOUS REACTION, PROFILE
----- Message from Belen Maradiaga RN sent at 8/24/2018  9:58 AM CDT -----  Regarding: INR with [facility]  Rosaura will have [facility] drawn INR on Thursday 8/30/18. Please call with warfarin instructions.    delayed awakening

## 2019-08-29 NOTE — H&P PST ADULT - HISTORY OF PRESENT ILLNESS
This is a 70 y/o male with history of lower back pain and spinal surgeries in the past presents with complaint of lower back pain ,weakness in left leg and numbness in his left toes, .Patient states   he injured his back 2 weeks ago  when  he bent over to provide food for his dogs and felt a spasm across the back. since then he  has been experiencing severe pain  and worse pain with bending , getting up from laying position , walking with pain scale 10/10 . patient is scheduled for microdiscectomy laminectomy L5-S1 on 9/3/19

## 2019-08-30 ENCOUNTER — MEDICATION RENEWAL (OUTPATIENT)
Age: 69
End: 2019-08-30

## 2019-08-30 LAB
ALBUMIN SERPL ELPH-MCNC: 4.4 G/DL
ALP BLD-CCNC: 140 U/L
ALT SERPL-CCNC: 27 U/L
ANION GAP SERPL CALC-SCNC: 16 MMOL/L
APTT BLD: 27.4 SEC
AST SERPL-CCNC: 25 U/L
BASOPHILS # BLD AUTO: 0.06 K/UL
BASOPHILS NFR BLD AUTO: 0.8 %
BILIRUB SERPL-MCNC: 0.4 MG/DL
BUN SERPL-MCNC: 21 MG/DL
CALCIUM SERPL-MCNC: 9.8 MG/DL
CHLORIDE SERPL-SCNC: 103 MMOL/L
CHOLEST SERPL-MCNC: 119 MG/DL
CHOLEST/HDLC SERPL: 2.4 RATIO
CO2 SERPL-SCNC: 24 MMOL/L
CREAT SERPL-MCNC: 1.07 MG/DL
EOSINOPHIL # BLD AUTO: 0.24 K/UL
EOSINOPHIL NFR BLD AUTO: 3.4 %
ESTIMATED AVERAGE GLUCOSE: 114 MG/DL
GLUCOSE SERPL-MCNC: 80 MG/DL
HBA1C MFR BLD HPLC: 5.6 %
HCT VFR BLD CALC: 45.3 %
HDLC SERPL-MCNC: 50 MG/DL
HGB BLD-MCNC: 15.4 G/DL
IMM GRANULOCYTES NFR BLD AUTO: 0.6 %
INR PPP: 0.91 RATIO
LDLC SERPL CALC-MCNC: 49 MG/DL
LYMPHOCYTES # BLD AUTO: 1.47 K/UL
LYMPHOCYTES NFR BLD AUTO: 20.8 %
MAN DIFF?: NORMAL
MCHC RBC-ENTMCNC: 29.7 PG
MCHC RBC-ENTMCNC: 34 GM/DL
MCV RBC AUTO: 87.3 FL
MONOCYTES # BLD AUTO: 0.91 K/UL
MONOCYTES NFR BLD AUTO: 12.9 %
NEUTROPHILS # BLD AUTO: 4.35 K/UL
NEUTROPHILS NFR BLD AUTO: 61.5 %
PLATELET # BLD AUTO: 218 K/UL
POTASSIUM SERPL-SCNC: 4.2 MMOL/L
PROT SERPL-MCNC: 6.6 G/DL
PT BLD: 10.3 SEC
RBC # BLD: 5.19 M/UL
RBC # FLD: 12.8 %
SODIUM SERPL-SCNC: 143 MMOL/L
TRIGL SERPL-MCNC: 99 MG/DL
WBC # FLD AUTO: 7.07 K/UL

## 2019-08-30 NOTE — ASSESSMENT
[FreeTextEntry4] : Patient is at low clinical cardiovascular risk for the planned, intermediate-risk surgery.  There are no medical contraindications to proceeding to the planned surgery. [Patient Optimized for Surgery] : Patient optimized for surgery

## 2019-08-30 NOTE — HISTORY OF PRESENT ILLNESS
[FreeTextEntry4] : Pt is going for repeat spinal surgery next week. Has been profoundly disabled by recurrence of his low back and leg sx.  he has had no major intercurrent illness.  His functional status prior to the resurgence of this issue was quite good.

## 2019-08-30 NOTE — ASU PATIENT PROFILE, ADULT - PSH
History of foot surgery  unsure of laterality, >10 ya  History of knee surgery  bilateral > 20 ya  History of shoulder surgery  right, 2006  History of spinal surgery  2014 2017 lumbar fusion  History of tonsillectomy  childhood  S/P arthroscopy of left shoulder  2018

## 2019-08-30 NOTE — ASU PATIENT PROFILE, ADULT - PMH
Chronic pain    Erectile dysfunction    Herniated lumbar intervertebral disc  L5-S1  Hypertension    Obesity (BMI 30-39.9)    Scoliosis    Seasonal allergies    Urinary frequency

## 2019-08-30 NOTE — PHYSICAL EXAM
[No Acute Distress] : no acute distress [Well Nourished] : well nourished [Well Developed] : well developed [Well-Appearing] : well-appearing [Normal Sclera/Conjunctiva] : normal sclera/conjunctiva [PERRL] : pupils equal round and reactive to light [EOMI] : extraocular movements intact [Normal Outer Ear/Nose] : the outer ears and nose were normal in appearance [Normal Oropharynx] : the oropharynx was normal [No JVD] : no jugular venous distention [No Lymphadenopathy] : no lymphadenopathy [Supple] : supple [Thyroid Normal, No Nodules] : the thyroid was normal and there were no nodules present [No Respiratory Distress] : no respiratory distress  [No Accessory Muscle Use] : no accessory muscle use [Normal Rate] : normal rate  [Clear to Auscultation] : lungs were clear to auscultation bilaterally [Regular Rhythm] : with a regular rhythm [Normal S1, S2] : normal S1 and S2 [No Murmur] : no murmur heard [No Abdominal Bruit] : a ~M bruit was not heard ~T in the abdomen [No Carotid Bruits] : no carotid bruits [No Varicosities] : no varicosities [No Edema] : there was no peripheral edema [Pedal Pulses Present] : the pedal pulses are present [No Palpable Aorta] : no palpable aorta [No Extremity Clubbing/Cyanosis] : no extremity clubbing/cyanosis [Soft] : abdomen soft [Non Tender] : non-tender [No Masses] : no abdominal mass palpated [Non-distended] : non-distended [No HSM] : no HSM [Normal Bowel Sounds] : normal bowel sounds [Normal Posterior Cervical Nodes] : no posterior cervical lymphadenopathy [Normal Anterior Cervical Nodes] : no anterior cervical lymphadenopathy [No CVA Tenderness] : no CVA  tenderness [No Spinal Tenderness] : no spinal tenderness [No Joint Swelling] : no joint swelling [Grossly Normal Strength/Tone] : grossly normal strength/tone [No Rash] : no rash [No Focal Deficits] : no focal deficits [Coordination Grossly Intact] : coordination grossly intact [Normal Gait] : normal gait [Normal Affect] : the affect was normal [Deep Tendon Reflexes (DTR)] : deep tendon reflexes were 2+ and symmetric [Normal Insight/Judgement] : insight and judgment were intact

## 2019-09-02 ENCOUNTER — TRANSCRIPTION ENCOUNTER (OUTPATIENT)
Age: 69
End: 2019-09-02

## 2019-09-03 ENCOUNTER — APPOINTMENT (OUTPATIENT)
Dept: ORTHOPEDIC SURGERY | Facility: HOSPITAL | Age: 69
End: 2019-09-03

## 2019-09-03 ENCOUNTER — RESULT REVIEW (OUTPATIENT)
Age: 69
End: 2019-09-03

## 2019-09-03 ENCOUNTER — OUTPATIENT (OUTPATIENT)
Dept: OUTPATIENT SERVICES | Facility: HOSPITAL | Age: 69
LOS: 1 days | End: 2019-09-03
Payer: COMMERCIAL

## 2019-09-03 VITALS
OXYGEN SATURATION: 100 % | HEIGHT: 67 IN | HEART RATE: 91 BPM | TEMPERATURE: 98 F | WEIGHT: 224.87 LBS | SYSTOLIC BLOOD PRESSURE: 140 MMHG | DIASTOLIC BLOOD PRESSURE: 77 MMHG | RESPIRATION RATE: 15 BRPM

## 2019-09-03 VITALS
SYSTOLIC BLOOD PRESSURE: 109 MMHG | OXYGEN SATURATION: 95 % | DIASTOLIC BLOOD PRESSURE: 68 MMHG | RESPIRATION RATE: 15 BRPM | HEART RATE: 76 BPM

## 2019-09-03 DIAGNOSIS — Z98.890 OTHER SPECIFIED POSTPROCEDURAL STATES: Chronic | ICD-10-CM

## 2019-09-03 DIAGNOSIS — M51.26 OTHER INTERVERTEBRAL DISC DISPLACEMENT, LUMBAR REGION: ICD-10-CM

## 2019-09-03 DIAGNOSIS — M48.061 SPINAL STENOSIS, LUMBAR REGION WITHOUT NEUROGENIC CLAUDICATION: ICD-10-CM

## 2019-09-03 DIAGNOSIS — Z90.89 ACQUIRED ABSENCE OF OTHER ORGANS: Chronic | ICD-10-CM

## 2019-09-03 PROCEDURE — 63047 LAM FACETEC & FORAMOT LUMBAR: CPT

## 2019-09-03 PROCEDURE — 88304 TISSUE EXAM BY PATHOLOGIST: CPT

## 2019-09-03 PROCEDURE — 63030 LAMOT DCMPRN NRV RT 1 LMBR: CPT | Mod: LT

## 2019-09-03 PROCEDURE — 97161 PT EVAL LOW COMPLEX 20 MIN: CPT

## 2019-09-03 PROCEDURE — 88304 TISSUE EXAM BY PATHOLOGIST: CPT | Mod: 26

## 2019-09-03 PROCEDURE — C1889: CPT

## 2019-09-03 PROCEDURE — 63030 LAMOT DCMPRN NRV RT 1 LMBR: CPT | Mod: 80,LT

## 2019-09-03 PROCEDURE — 76000 FLUOROSCOPY <1 HR PHYS/QHP: CPT

## 2019-09-03 RX ORDER — SODIUM CHLORIDE 9 MG/ML
1000 INJECTION, SOLUTION INTRAVENOUS
Refills: 0 | Status: DISCONTINUED | OUTPATIENT
Start: 2019-09-03 | End: 2019-09-03

## 2019-09-03 RX ORDER — CEFAZOLIN SODIUM 1 G
2000 VIAL (EA) INJECTION ONCE
Refills: 0 | Status: COMPLETED | OUTPATIENT
Start: 2019-09-03 | End: 2019-09-03

## 2019-09-03 RX ORDER — ACETAMINOPHEN 500 MG
1000 TABLET ORAL ONCE
Refills: 0 | Status: COMPLETED | OUTPATIENT
Start: 2019-09-03 | End: 2019-09-03

## 2019-09-03 RX ORDER — ONDANSETRON 8 MG/1
4 TABLET, FILM COATED ORAL ONCE
Refills: 0 | Status: DISCONTINUED | OUTPATIENT
Start: 2019-09-03 | End: 2019-09-03

## 2019-09-03 RX ORDER — HYDROMORPHONE HYDROCHLORIDE 2 MG/ML
0.5 INJECTION INTRAMUSCULAR; INTRAVENOUS; SUBCUTANEOUS
Refills: 0 | Status: DISCONTINUED | OUTPATIENT
Start: 2019-09-03 | End: 2019-09-03

## 2019-09-03 RX ORDER — DIAZEPAM 5 MG
1 TABLET ORAL
Qty: 20 | Refills: 0
Start: 2019-09-03

## 2019-09-03 RX ORDER — HYDROMORPHONE HYDROCHLORIDE 2 MG/ML
1 INJECTION INTRAMUSCULAR; INTRAVENOUS; SUBCUTANEOUS
Qty: 42 | Refills: 0
Start: 2019-09-03

## 2019-09-03 RX ADMIN — SODIUM CHLORIDE 100 MILLILITER(S): 9 INJECTION, SOLUTION INTRAVENOUS at 15:03

## 2019-09-03 NOTE — ASU DISCHARGE PLAN (ADULT/PEDIATRIC) - CALL YOUR DOCTOR IF YOU HAVE ANY OF THE FOLLOWING:
Wound/Surgical Site with redness, or foul smelling discharge or pus/Nausea and vomiting that does not stop/Inability to tolerate liquids or foods/Unable to urinate/Fever greater than (need to indicate Fahrenheit or Celsius)/Bleeding that does not stop

## 2019-09-03 NOTE — ASU DISCHARGE PLAN (ADULT/PEDIATRIC) - CARE PROVIDER_API CALL
Carlo Salazar (MD)  Orthopaedic Surgery  [address], [address]  [place] [address]  Phone: [phone]  Fax: [phone]  Follow Up Time:

## 2019-09-03 NOTE — PHYSICAL THERAPY INITIAL EVALUATION ADULT - GAIT TRAINING, PT EVAL
Ambulates 150 feet with RW FWB bilat LEs independently in 2-3 days. Negotiates 5 steps with handrail and cane EWB bilat LEs independently in 2-3 days

## 2019-09-03 NOTE — PHYSICAL THERAPY INITIAL EVALUATION ADULT - ACTIVE RANGE OF MOTION EXAMINATION, REHAB EVAL
bilateral lower extremity Active ROM was WNL (within normal limits)/helen. upper extremity Active ROM was WNL (within normal limits)

## 2019-09-07 ENCOUNTER — MOBILE ON CALL (OUTPATIENT)
Age: 69
End: 2019-09-07

## 2019-09-18 ENCOUNTER — APPOINTMENT (OUTPATIENT)
Dept: ORTHOPEDIC SURGERY | Facility: CLINIC | Age: 69
End: 2019-09-18
Payer: COMMERCIAL

## 2019-09-18 VITALS — SYSTOLIC BLOOD PRESSURE: 134 MMHG | DIASTOLIC BLOOD PRESSURE: 84 MMHG | HEART RATE: 97 BPM | HEIGHT: 66 IN

## 2019-09-18 PROCEDURE — 99024 POSTOP FOLLOW-UP VISIT: CPT

## 2019-09-18 PROCEDURE — 72100 X-RAY EXAM L-S SPINE 2/3 VWS: CPT

## 2019-10-30 ENCOUNTER — APPOINTMENT (OUTPATIENT)
Dept: ORTHOPEDIC SURGERY | Facility: CLINIC | Age: 69
End: 2019-10-30
Payer: MEDICARE

## 2019-10-30 VITALS — HEIGHT: 66 IN | SYSTOLIC BLOOD PRESSURE: 136 MMHG | HEART RATE: 108 BPM | DIASTOLIC BLOOD PRESSURE: 85 MMHG

## 2019-10-30 DIAGNOSIS — Z98.890 OTHER SPECIFIED POSTPROCEDURAL STATES: ICD-10-CM

## 2019-10-30 PROCEDURE — 99024 POSTOP FOLLOW-UP VISIT: CPT

## 2019-11-14 ENCOUNTER — RX RENEWAL (OUTPATIENT)
Age: 69
End: 2019-11-14

## 2019-12-11 ENCOUNTER — APPOINTMENT (OUTPATIENT)
Dept: ORTHOPEDIC SURGERY | Facility: CLINIC | Age: 69
End: 2019-12-11
Payer: MEDICARE

## 2019-12-11 PROCEDURE — 99213 OFFICE O/P EST LOW 20 MIN: CPT

## 2019-12-27 ENCOUNTER — APPOINTMENT (OUTPATIENT)
Dept: ORTHOPEDIC SURGERY | Facility: CLINIC | Age: 69
End: 2019-12-27
Payer: MEDICARE

## 2019-12-27 PROCEDURE — 99214 OFFICE O/P EST MOD 30 MIN: CPT

## 2019-12-27 NOTE — HISTORY OF PRESENT ILLNESS
[Ambidextrous] : ambidextrous [FreeTextEntry1] : He comes in today for evaluation of left hand numbness and tingling.  He has had symptoms for years but these have recently worsened.  He is particular symptomatic at night.  He has mild symptoms on the right side.\par \par He was accompanied by his wife today.

## 2019-12-27 NOTE — DISCUSSION/SUMMARY
[FreeTextEntry1] : He has findings consistent with chronic left carpal tunnel syndrome.\par \par I had a discussion regarding today's visit, the diagnosis, and treatment recommendations / options.  At this time I have ordered EMGs to confirm the diagnosis.  I did tell him that more than likely he will require surgical release, based upon the history.  He agrees.  He will follow-up after the EMG to review the results and discuss definitive treatment recommendations.\par \par The patient has agreed to this plan of management and has expressed full understanding.  All questions were fully answered to the patient's satisfaction.\par \par Over 50% of the time spent with the patient was on counseling the patient on the above diagnosis, treatment plan and prognosis.

## 2019-12-27 NOTE — PHYSICAL EXAM
[de-identified] : - Constitutional: This is a male, in no obvious distress.  \par - Psych: Patient is alert and oriented to person, place and time.  Patient has a normal mood and affect.\par - Cardiovascular: Normal pulses throughout the upper extremities.  No significant varicosities are noted in the upper extremities. \par - Respiratory:  Patient exhibits no evidence of shortness of breath or difficulty breathing.\par - Skin: No rashes, lesions, or other abnormalities are noted in the upper extremities.\par \par ---\par \par Examination of his left hand demonstrates mild thenar atrophy.  He he has intact sensation to light touch throughout the radial, ulnar median nerve distributions.  Provocative signs for carpal tunnel syndrome are negative.  He has full flexion and extension of the digits.

## 2019-12-31 ENCOUNTER — RX RENEWAL (OUTPATIENT)
Age: 69
End: 2019-12-31

## 2020-01-08 ENCOUNTER — APPOINTMENT (OUTPATIENT)
Dept: ORTHOPEDIC SURGERY | Facility: CLINIC | Age: 70
End: 2020-01-08
Payer: MEDICARE

## 2020-01-08 PROCEDURE — 99214 OFFICE O/P EST MOD 30 MIN: CPT

## 2020-01-08 NOTE — PHYSICAL EXAM
[de-identified] : - Constitutional: This is a male, in no obvious distress.  \par - Psych: Patient is alert and oriented to person, place and time.  Patient has a normal mood and affect.\par - Cardiovascular: Normal pulses throughout the upper extremities.  No significant varicosities are noted in the upper extremities. \par - Respiratory:  Patient exhibits no evidence of shortness of breath or difficulty breathing.\par - Skin: No rashes, lesions, or other abnormalities are noted in the upper extremities.\par \par ---\par \par Examination of his left hand demonstrates mild thenar atrophy.  He he has intact sensation to light touch throughout the radial, ulnar median nerve distributions.  Provocative signs for carpal tunnel syndrome are negative.  He has full flexion and extension of the digits.

## 2020-01-08 NOTE — ADDENDUM
[FreeTextEntry1] : I, Kendall Gaspar, acted solely as a scribe for Dr. Edgar Brock on 01/08/2020 . \par \par All medical record entries made by the Scribe were at my, Dr. Edgar Brock, direction and personally dictated by me on 01/08/2020. I have personally reviewed the chart and agree that the record accurately reflects my personal performance of the history, physical exam, assessment and plan.

## 2020-01-08 NOTE — DISCUSSION/SUMMARY
[FreeTextEntry1] : I reviewed his EMGs.  These demonstrated.  Bilateral carpal tunnel syndrome, advanced on the left.\par \par I reviewed the EMG results with him.  I had a discussion regarding today's visit, the diagnosis and treatment recommendations / options.  At this time, I have recommended surgical release and he has agreed to proceed.  He will be scheduled in the near future.  He has agreed to proceed with endoscopic release.\par \par -  The nature and purposes of endoscopic carpal tunnel release was discussed in detail with the patient.   We discussed the surgical procedure in detail, as well as expected postoperative recovery and outcome.\par -  Possible risks, benefits and complications (from known and unknown causes) of the procedure were discussed in detail.  \par -  Possible non-operative alternatives to the proposed treatment were discussed in detail.  \par -  I discussed with the patient that I will be performing an endoscopic carpal tunnel release. We discussed both open and endoscopic carpal tunnel release, and the associated risks and benefits of each of these procedures. The patient understands that it is possible that the endoscopic carpal tunnel release may need to be converted to an open release during the procedure, if the carpal tunnel cannot be well visualized or safely released endoscopically. In addition, the patient does understand that, based upon some of the hand surgery literature, there is a slightly higher risk of iatrogenic nerve or vessel injury associated with endoscopic carpal tunnel release, as compared to open carpal tunnel release.\par -  He.  Was also told that other possible risks/complications include, but are not limited to:  Infection, nerve or vessel injury, stiffness, painful scar, poor outcome, need for additional surgical procedures, and other unforeseen complications.  \par -  In addition, the possibility of an "unsuccessful outcome," despite "successful surgery," was discussed with the patient.  The patient understands that nerve recovery after surgical release can be unpredictable, and there are no "guarantees" that the preoperative symptoms will completely resolve.\par -  The patient fully understands these risks and wishes to proceed.  \par -  I had a lengthy discussion with the patient regarding today's visit, the diagnosis, and my surgical treatment recommendations.  The patient has agreed to this plan of management and has expressed full understanding.  All questions were fully answered to the patient's satisfaction.\par \par I spent at least 25 minutes of face-to-face time with the patient.  Over 50% of this time was spent on counseling the patient on the above diagnosis, treatment plan and prognosis.

## 2020-01-08 NOTE — HISTORY OF PRESENT ILLNESS
[Ambidextrous] : ambidextrous [FreeTextEntry1] : Follow-up regarding chronic left carpal tunnel syndrome.  See note from when he was seen in the office 12 days ago.  I ordered EMGs.  He comes in to review the results and discuss treatment recommendations.\par \par He was accompanied by his wife today.

## 2020-01-10 ENCOUNTER — RX RENEWAL (OUTPATIENT)
Age: 70
End: 2020-01-10

## 2020-01-13 ENCOUNTER — OUTPATIENT (OUTPATIENT)
Dept: OUTPATIENT SERVICES | Facility: HOSPITAL | Age: 70
LOS: 1 days | End: 2020-01-13
Payer: MEDICARE

## 2020-01-13 VITALS
WEIGHT: 238.98 LBS | SYSTOLIC BLOOD PRESSURE: 126 MMHG | DIASTOLIC BLOOD PRESSURE: 78 MMHG | TEMPERATURE: 98 F | RESPIRATION RATE: 16 BRPM | HEART RATE: 98 BPM | HEIGHT: 66 IN | OXYGEN SATURATION: 98 %

## 2020-01-13 DIAGNOSIS — Z01.818 ENCOUNTER FOR OTHER PREPROCEDURAL EXAMINATION: ICD-10-CM

## 2020-01-13 DIAGNOSIS — Z98.890 OTHER SPECIFIED POSTPROCEDURAL STATES: Chronic | ICD-10-CM

## 2020-01-13 DIAGNOSIS — Z90.89 ACQUIRED ABSENCE OF OTHER ORGANS: Chronic | ICD-10-CM

## 2020-01-13 DIAGNOSIS — G56.02 CARPAL TUNNEL SYNDROME, LEFT UPPER LIMB: ICD-10-CM

## 2020-01-13 LAB
ANION GAP SERPL CALC-SCNC: 7 MMOL/L — SIGNIFICANT CHANGE UP (ref 5–17)
BUN SERPL-MCNC: 19 MG/DL — SIGNIFICANT CHANGE UP (ref 7–23)
CALCIUM SERPL-MCNC: 8.5 MG/DL — SIGNIFICANT CHANGE UP (ref 8.4–10.5)
CHLORIDE SERPL-SCNC: 106 MMOL/L — SIGNIFICANT CHANGE UP (ref 96–108)
CO2 SERPL-SCNC: 26 MMOL/L — SIGNIFICANT CHANGE UP (ref 22–31)
CREAT SERPL-MCNC: 0.96 MG/DL — SIGNIFICANT CHANGE UP (ref 0.5–1.3)
GLUCOSE SERPL-MCNC: 125 MG/DL — HIGH (ref 70–99)
HCT VFR BLD CALC: 40.9 % — SIGNIFICANT CHANGE UP (ref 39–50)
HGB BLD-MCNC: 13.9 G/DL — SIGNIFICANT CHANGE UP (ref 13–17)
MCHC RBC-ENTMCNC: 29.3 PG — SIGNIFICANT CHANGE UP (ref 27–34)
MCHC RBC-ENTMCNC: 34 GM/DL — SIGNIFICANT CHANGE UP (ref 32–36)
MCV RBC AUTO: 86.1 FL — SIGNIFICANT CHANGE UP (ref 80–100)
NRBC # BLD: 0 /100 WBCS — SIGNIFICANT CHANGE UP (ref 0–0)
PLATELET # BLD AUTO: 169 K/UL — SIGNIFICANT CHANGE UP (ref 150–400)
POTASSIUM SERPL-MCNC: 3.9 MMOL/L — SIGNIFICANT CHANGE UP (ref 3.5–5.3)
POTASSIUM SERPL-SCNC: 3.9 MMOL/L — SIGNIFICANT CHANGE UP (ref 3.5–5.3)
RBC # BLD: 4.75 M/UL — SIGNIFICANT CHANGE UP (ref 4.2–5.8)
RBC # FLD: 12.9 % — SIGNIFICANT CHANGE UP (ref 10.3–14.5)
SODIUM SERPL-SCNC: 139 MMOL/L — SIGNIFICANT CHANGE UP (ref 135–145)
WBC # BLD: 7.52 K/UL — SIGNIFICANT CHANGE UP (ref 3.8–10.5)
WBC # FLD AUTO: 7.52 K/UL — SIGNIFICANT CHANGE UP (ref 3.8–10.5)

## 2020-01-13 PROCEDURE — 85027 COMPLETE CBC AUTOMATED: CPT

## 2020-01-13 PROCEDURE — 93010 ELECTROCARDIOGRAM REPORT: CPT

## 2020-01-13 PROCEDURE — 36415 COLL VENOUS BLD VENIPUNCTURE: CPT

## 2020-01-13 PROCEDURE — 93005 ELECTROCARDIOGRAM TRACING: CPT

## 2020-01-13 PROCEDURE — G0463: CPT

## 2020-01-13 PROCEDURE — 80048 BASIC METABOLIC PNL TOTAL CA: CPT

## 2020-01-13 RX ORDER — ACETAMINOPHEN 500 MG
2 TABLET ORAL
Qty: 0 | Refills: 0 | DISCHARGE

## 2020-01-13 NOTE — H&P PST ADULT - HISTORY OF PRESENT ILLNESS
71 y/o male with left carpal tunnel for more than a year. Last 2 months ith got worsened with tingling, numbness and waking him from sleep. WAs advised surgery

## 2020-01-13 NOTE — H&P PST ADULT - NSICDXPASTMEDICALHX_GEN_ALL_CORE_FT
PAST MEDICAL HISTORY:  BPH without urinary obstruction     Chronic GERD     Chronic pain back pain, -multiple surgeries    Erectile dysfunction     Hypertension     Obesity (BMI 30-39.9)     Scoliosis     Seasonal allergies     Urinary frequency

## 2020-01-13 NOTE — H&P PST ADULT - NSICDXPASTSURGICALHX_GEN_ALL_CORE_FT
PAST SURGICAL HISTORY:  History of foot surgery unsure of laterality, >10 ya    History of knee surgery bilateral > 20 ya    History of shoulder surgery right, 2006    History of spinal surgery 2014 2017 lumbar fusion, s/p laminectomy 9/3/19    History of tonsillectomy childhood    S/P arthroscopy of left shoulder 2018

## 2020-01-13 NOTE — H&P PST ADULT - ASSESSMENT
71 y/o male with left carpal tunnel syndrome  Planned surgery.- endoscopic left carpal tunnel release   Will obtain medical clearance  Pre op instructions provided  Instructions provided on medications to continue and to take the day morning of surgery

## 2020-01-16 ENCOUNTER — APPOINTMENT (OUTPATIENT)
Dept: INTERNAL MEDICINE | Facility: CLINIC | Age: 70
End: 2020-01-16
Payer: MEDICARE

## 2020-01-16 VITALS — SYSTOLIC BLOOD PRESSURE: 110 MMHG | RESPIRATION RATE: 14 BRPM | DIASTOLIC BLOOD PRESSURE: 72 MMHG | HEART RATE: 78 BPM

## 2020-01-16 DIAGNOSIS — Z87.440 PERSONAL HISTORY OF URINARY (TRACT) INFECTIONS: ICD-10-CM

## 2020-01-16 DIAGNOSIS — Z01.818 ENCOUNTER FOR OTHER PREPROCEDURAL EXAMINATION: ICD-10-CM

## 2020-01-16 DIAGNOSIS — Z87.09 PERSONAL HISTORY OF OTHER DISEASES OF THE RESPIRATORY SYSTEM: ICD-10-CM

## 2020-01-16 PROCEDURE — 99213 OFFICE O/P EST LOW 20 MIN: CPT

## 2020-01-16 RX ORDER — METHYLPREDNISOLONE 4 MG/1
4 TABLET ORAL
Qty: 1 | Refills: 0 | Status: DISCONTINUED | COMMUNITY
Start: 2019-09-07 | End: 2020-01-16

## 2020-01-16 RX ORDER — SILDENAFIL 20 MG/1
20 TABLET ORAL
Qty: 30 | Refills: 0 | Status: DISCONTINUED | COMMUNITY
Start: 2018-07-06 | End: 2020-01-16

## 2020-01-16 RX ORDER — DICLOFENAC SODIUM 75 MG/1
75 TABLET, DELAYED RELEASE ORAL
Qty: 30 | Refills: 3 | Status: DISCONTINUED | COMMUNITY
Start: 2017-04-24 | End: 2020-01-16

## 2020-01-16 RX ORDER — DIAZEPAM 5 MG/1
5 TABLET ORAL
Qty: 18 | Refills: 0 | Status: DISCONTINUED | COMMUNITY
Start: 2017-03-28 | End: 2020-01-16

## 2020-01-16 NOTE — ASSESSMENT
[High Risk Surgery - Intraperitoneal, Intrathoracic or Supringuinal Vascular Procedures] : High Risk Surgery - Intraperitoneal, Intrathoracic or Supringuinal Vascular Procedures - No (0) [Congestive Heart Failure] : Congestive Heart Failure - No (0) [Ischemic Heart Disease] : Ischemic Heart Disease - No (0) [Prior Cerebrovascular Accident or TIA] : Prior Cerebrovascular Accident or TIA - No (0) [Creatinine >= 2mg/dL (1 Point)] : Creatinine >= 2mg/dL - No (0) [Insulin-dependent Diabetic (1 Point)] : Insulin-dependent Diabetic - No (0) [Patient Optimized for Surgery] : Patient optimized for surgery [0] : 0 , RCRI Class: I, Risk of Post-Op Cardiac Complications: 0.4%, Procedure Risk: Low-Risk [No Further Testing Recommended] : no further testing recommended [Continue medications as is] : Continue current medications [FreeTextEntry7] : Take amlodipine - benezapril AM of surgery

## 2020-01-16 NOTE — RESULTS/DATA
[] : results reviewed [Normal] : The 12 - lead ECG is normal [ECG Reviewed] : reviewed [de-identified] : WNL [de-identified] : WNL

## 2020-01-16 NOTE — HISTORY OF PRESENT ILLNESS
[Aortic Stenosis] : no aortic stenosis [Atrial Fibrillation] : no atrial fibrillation [Coronary Artery Disease] : no coronary artery disease [Recent Myocardial Infarction] : no recent myocardial infarction [Implantable Device/Pacemaker] : no implantable device/pacemaker [Asthma] : no asthma [COPD] : no COPD [Self] : no previous adverse anesthesia reaction [Sleep Apnea] : no sleep apnea [Smoker] : not a smoker [Chronic Kidney Disease] : no chronic kidney disease [Diabetes] : no diabetes [FreeTextEntry1] : Left carpal tunnel release [(Patient denies any chest pain, claudication, dyspnea on exertion, orthopnea, palpitations or syncope)] : Patient denies any chest pain, claudication, dyspnea on exertion, orthopnea, palpitations or syncope [FreeTextEntry2] : 1/21/20 [FreeTextEntry3] : Dr. Brock [FreeTextEntry6] : No bleeding or bruising tendencies.\par  [FreeTextEntry4] : L CTS with intractable pain and numbness.

## 2020-01-20 ENCOUNTER — TRANSCRIPTION ENCOUNTER (OUTPATIENT)
Age: 70
End: 2020-01-20

## 2020-01-20 PROBLEM — G89.29 OTHER CHRONIC PAIN: Chronic | Status: ACTIVE | Noted: 2017-03-02

## 2020-01-20 PROBLEM — K21.9 GASTRO-ESOPHAGEAL REFLUX DISEASE WITHOUT ESOPHAGITIS: Chronic | Status: ACTIVE | Noted: 2020-01-13

## 2020-01-20 PROBLEM — N40.0 BENIGN PROSTATIC HYPERPLASIA WITHOUT LOWER URINARY TRACT SYMPTOMS: Chronic | Status: ACTIVE | Noted: 2020-01-13

## 2020-01-21 ENCOUNTER — OUTPATIENT (OUTPATIENT)
Dept: OUTPATIENT SERVICES | Facility: HOSPITAL | Age: 70
LOS: 1 days | Discharge: HOME | End: 2020-01-21
Payer: MEDICARE

## 2020-01-21 ENCOUNTER — APPOINTMENT (OUTPATIENT)
Dept: ORTHOPEDIC SURGERY | Facility: HOSPITAL | Age: 70
End: 2020-01-21

## 2020-01-21 VITALS
WEIGHT: 231.49 LBS | SYSTOLIC BLOOD PRESSURE: 135 MMHG | DIASTOLIC BLOOD PRESSURE: 78 MMHG | RESPIRATION RATE: 20 BRPM | HEIGHT: 67 IN | TEMPERATURE: 98 F | HEART RATE: 97 BPM | OXYGEN SATURATION: 96 %

## 2020-01-21 VITALS
RESPIRATION RATE: 21 BRPM | DIASTOLIC BLOOD PRESSURE: 79 MMHG | HEART RATE: 82 BPM | OXYGEN SATURATION: 98 % | SYSTOLIC BLOOD PRESSURE: 115 MMHG

## 2020-01-21 DIAGNOSIS — Z01.818 ENCOUNTER FOR OTHER PREPROCEDURAL EXAMINATION: ICD-10-CM

## 2020-01-21 DIAGNOSIS — Z98.890 OTHER SPECIFIED POSTPROCEDURAL STATES: Chronic | ICD-10-CM

## 2020-01-21 DIAGNOSIS — Z90.89 ACQUIRED ABSENCE OF OTHER ORGANS: Chronic | ICD-10-CM

## 2020-01-21 DIAGNOSIS — G56.02 CARPAL TUNNEL SYNDROME, LEFT UPPER LIMB: ICD-10-CM

## 2020-01-21 PROCEDURE — 64721 CARPAL TUNNEL SURGERY: CPT | Mod: LT

## 2020-01-21 RX ORDER — CHLORHEXIDINE GLUCONATE 213 G/1000ML
1 SOLUTION TOPICAL ONCE
Refills: 0 | Status: COMPLETED | OUTPATIENT
Start: 2020-01-21 | End: 2020-01-21

## 2020-01-21 RX ORDER — ONDANSETRON 8 MG/1
4 TABLET, FILM COATED ORAL ONCE
Refills: 0 | Status: DISCONTINUED | OUTPATIENT
Start: 2020-01-21 | End: 2020-01-21

## 2020-01-21 RX ORDER — CEFAZOLIN SODIUM 1 G
2000 VIAL (EA) INJECTION ONCE
Refills: 0 | Status: COMPLETED | OUTPATIENT
Start: 2020-01-21 | End: 2020-01-21

## 2020-01-21 RX ORDER — AMLODIPINE BESYLATE AND BENAZEPRIL HYDROCHLORIDE 10; 20 MG/1; MG/1
1 CAPSULE ORAL
Qty: 0 | Refills: 0 | DISCHARGE

## 2020-01-21 RX ORDER — SODIUM CHLORIDE 9 MG/ML
1000 INJECTION, SOLUTION INTRAVENOUS
Refills: 0 | Status: DISCONTINUED | OUTPATIENT
Start: 2020-01-21 | End: 2020-01-21

## 2020-01-21 RX ORDER — ROSUVASTATIN CALCIUM 5 MG/1
1 TABLET ORAL
Qty: 0 | Refills: 0 | DISCHARGE

## 2020-01-21 RX ORDER — HYDROMORPHONE HYDROCHLORIDE 2 MG/ML
0.5 INJECTION INTRAMUSCULAR; INTRAVENOUS; SUBCUTANEOUS
Refills: 0 | Status: DISCONTINUED | OUTPATIENT
Start: 2020-01-21 | End: 2020-01-21

## 2020-01-21 RX ORDER — ACETAMINOPHEN WITH CODEINE 300MG-30MG
1 TABLET ORAL
Qty: 5 | Refills: 0
Start: 2020-01-21

## 2020-01-21 RX ORDER — APREPITANT 80 MG/1
40 CAPSULE ORAL ONCE
Refills: 0 | Status: COMPLETED | OUTPATIENT
Start: 2020-01-21 | End: 2020-01-21

## 2020-01-21 RX ORDER — ALFUZOSIN HYDROCHLORIDE 10 MG/1
1 TABLET, EXTENDED RELEASE ORAL
Qty: 0 | Refills: 0 | DISCHARGE

## 2020-01-21 RX ORDER — OMEPRAZOLE 10 MG/1
1 CAPSULE, DELAYED RELEASE ORAL
Qty: 0 | Refills: 0 | DISCHARGE

## 2020-01-21 RX ORDER — FINASTERIDE 5 MG/1
1 TABLET, FILM COATED ORAL
Qty: 0 | Refills: 0 | DISCHARGE

## 2020-01-21 RX ADMIN — APREPITANT 40 MILLIGRAM(S): 80 CAPSULE ORAL at 09:58

## 2020-01-21 RX ADMIN — SODIUM CHLORIDE 50 MILLILITER(S): 9 INJECTION, SOLUTION INTRAVENOUS at 13:42

## 2020-01-21 RX ADMIN — CHLORHEXIDINE GLUCONATE 1 APPLICATION(S): 213 SOLUTION TOPICAL at 09:58

## 2020-01-21 NOTE — ASU DISCHARGE PLAN (ADULT/PEDIATRIC) - CALL YOUR DOCTOR IF YOU HAVE ANY OF THE FOLLOWING:
Fever greater than (need to indicate Fahrenheit or Celsius)/Bleeding that does not stop/Wound/Surgical Site with redness, or foul smelling discharge or pus/Numbness, tingling, color or temperature change to extremity/Swelling that gets worse/Pain not relieved by Medications

## 2020-01-21 NOTE — ASU PATIENT PROFILE, ADULT - PSH
History of foot surgery  unsure of laterality, >10 ya  History of knee surgery  bilateral > 20 ya  History of shoulder surgery  right, 2006  History of spinal surgery  2014 2017 lumbar fusion, s/p laminectomy 9/3/19  History of tonsillectomy  childhood  S/P arthroscopy of left shoulder  2018

## 2020-01-21 NOTE — ASU DISCHARGE PLAN (ADULT/PEDIATRIC) - CARE PROVIDER_API CALL
Edgar Brock)  Orthopaedic Surgery; Surgery of the Hand  [address], [address] [address]  [place] [address]  Phone: [phone]  Fax: [phone]  Follow Up Time:

## 2020-01-21 NOTE — BRIEF OPERATIVE NOTE - NSICDXBRIEFPROCEDURE_GEN_ALL_CORE_FT
PROCEDURES:  Carpal tunnel release 21-Jan-2020 12:30:42 left endoscopic Geno Martin PROCEDURES:  Carpal tunnel release 21-Jan-2020 12:30:42 left Geno Martin

## 2020-01-21 NOTE — ASU PATIENT PROFILE, ADULT - PMH
BPH without urinary obstruction    Chronic GERD    Chronic pain  back pain, -multiple surgeries  Erectile dysfunction    Hypertension    Obesity (BMI 30-39.9)    Scoliosis    Seasonal allergies    Urinary frequency

## 2020-01-24 ENCOUNTER — APPOINTMENT (OUTPATIENT)
Dept: ORTHOPEDIC SURGERY | Facility: CLINIC | Age: 70
End: 2020-01-24
Payer: MEDICARE

## 2020-01-24 PROCEDURE — 99024 POSTOP FOLLOW-UP VISIT: CPT

## 2020-01-24 NOTE — ADDENDUM
[FreeTextEntry1] : I, Kendall Gaspar, acted solely as a scribe for Dr. Edgar Brock on 01/24/2020 . \par \par All medical record entries made by the Scribe were at my, Dr. Edgar Brock, direction and personally dictated by me on 01/24/2020. I have personally reviewed the chart and agree that the record accurately reflects my personal performance of the history, physical exam, assessment and plan.

## 2020-01-24 NOTE — HISTORY OF PRESENT ILLNESS
[de-identified] : 3 days status post left open carpal tunnel release.  This was an attempted endoscopic release that was converted to open.\par \par He states that he feels numbness in his fingers.  He feels as if the bandages too tight.  He no longer has the pain or burning at night that he had before the surgery.  [de-identified] : 3 days postoperative. [de-identified] : A soft dressing was applied.  He was instructed on local wound care and range of motion exercises. He will follow-up in 1 week for suture removal. [de-identified] : Stable, 3 days postoperative. [de-identified] : Examination of his left wrist and hand after the dressing was removed demonstrates his incisions to be clean and dry.  There is some swelling and ecchymosis as expected.  He has appropriate flexion and extension of the digits.  He has intact sensation to light touch throughout the radial, ulnar and median nerve distributions.

## 2020-01-31 ENCOUNTER — APPOINTMENT (OUTPATIENT)
Dept: ORTHOPEDIC SURGERY | Facility: CLINIC | Age: 70
End: 2020-01-31
Payer: MEDICARE

## 2020-01-31 PROCEDURE — 99024 POSTOP FOLLOW-UP VISIT: CPT

## 2020-01-31 NOTE — HISTORY OF PRESENT ILLNESS
[de-identified] : 10 days postoperative. [de-identified] : 10 days status post left open carpal tunnel release.  This was an attempted endoscopic release that was converted to open.\par \par He is doing well.  He no longer has tingling or burning at night.  He does have residual numbness as expected. [de-identified] : Examination of his left wrist and hand demonstrates his incisions to be healed healing well.  There is decreased swelling.  He has improved flexion and extension.  He has normal sensation to light touch throughout the digits, but does have complaints of some residual numbness.. [de-identified] : Stable, 10 days postoperative. [de-identified] : The sutures were removed and Steri-Strips were applied.  He was instructed on local wound care, when to begin scar massage and desensitization, and range of motion exercises.  He will followup in 4 weeks.  The patient was instructed to return before then or to call the office if there are any problems in the interim.

## 2020-02-28 ENCOUNTER — APPOINTMENT (OUTPATIENT)
Dept: ORTHOPEDIC SURGERY | Facility: CLINIC | Age: 70
End: 2020-02-28
Payer: MEDICARE

## 2020-02-28 PROCEDURE — 99024 POSTOP FOLLOW-UP VISIT: CPT

## 2020-02-28 NOTE — HISTORY OF PRESENT ILLNESS
[de-identified] : 38 days postoperative. [de-identified] : 38 days status post left open carpal tunnel release.  This was an attempted endoscopic release that was converted to open.\par \par He is doing well.  He no longer has tingling or burning at night.  He does have residual numbness as expected.  [de-identified] : Examination of his left wrist and hand demonstrates his incisions to be healed healing well.  There is he She comes in today for evaluation of swelling.  He has full flexion and extension.  He has normal sensation to light touch throughout the digits, but does have complaints of some residual numbness. [de-identified] : He was instructed on continued range of motion exercises and scar massage and desensitization.  He will follow-up in 6 to 8 weeks to assess his progress. [de-identified] : Stable, 38 days postoperative.

## 2020-02-28 NOTE — ADDENDUM
[FreeTextEntry1] : I, Kendall Gaspar, acted solely as a scribe for Dr. Edgar Brock on 02/28/2020 . \par \par All medical record entries made by the Scribe were at my, Dr. Edgar Brock, direction and personally dictated by me on 02/28/2020. I have personally reviewed the chart and agree that the record accurately reflects my personal performance of the history, physical exam, assessment and plan.

## 2020-03-11 ENCOUNTER — APPOINTMENT (OUTPATIENT)
Dept: ORTHOPEDIC SURGERY | Facility: CLINIC | Age: 70
End: 2020-03-11

## 2020-04-24 ENCOUNTER — APPOINTMENT (OUTPATIENT)
Dept: ORTHOPEDIC SURGERY | Facility: CLINIC | Age: 70
End: 2020-04-24
Payer: MEDICARE

## 2020-04-24 PROBLEM — G56.02 CARPAL TUNNEL SYNDROME OF LEFT WRIST: Status: ACTIVE | Noted: 2019-12-27

## 2020-05-01 ENCOUNTER — APPOINTMENT (OUTPATIENT)
Dept: ORTHOPEDIC SURGERY | Facility: CLINIC | Age: 70
End: 2020-05-01
Payer: MEDICARE

## 2020-05-01 VITALS — TEMPERATURE: 98.3 F

## 2020-05-01 DIAGNOSIS — G56.02 CARPAL TUNNEL SYNDROME, LEFT UPPER LIMB: ICD-10-CM

## 2020-05-01 PROCEDURE — 99213 OFFICE O/P EST LOW 20 MIN: CPT

## 2020-05-01 NOTE — HISTORY OF PRESENT ILLNESS
[de-identified] : Greater than 3 months postoperative. [de-identified] : Greater than 3 months status post left open carpal tunnel release.  This was an attempted endoscopic release that was converted to open.\par \par He is doing well.  He no longer has tingling or burning at night.  He does have residual numbness as expected.  He does not believe that this is appreciably improved. [de-identified] : - Constitutional: This is a male in no obvious distress.  \par - Psych: Patient is alert and oriented to person, place and time.  Patient has a normal mood and affect.\par - Cardiovascular: Normal pulses throughout the upper extremities.  No significant varicosities are noted in the upper extremities. \par - Neuro: Patient has normal coordination.\par - Respiratory:  Patient exhibits no evidence of shortness of breath or difficulty breathing.\par - Skin: No rashes, lesions, or other abnormalities are noted in the upper extremities.\par \par ---\par \par Examination of his left hand and wrist demonstrates his incisions to be well-healed.  He has full flexion and extension of the digits.  Provocative signs for carpal tunnel syndrome are negative.  He has decreased in station to light touch along the median nerve distribution with normal sensation along the radial and ulnar nerve distributions.\par \par Examination of his left wrist and hand demonstrates his incisions to be healed healing well.  There is he She comes in today for evaluation of swelling.  He has full flexion and extension.  He has normal sensation to light touch throughout the digits, but does have complaints of some residual numbness. [de-identified] : Stable, greater than 3 months postoperative.  He no longer has pain or burning but does have residual numbness. [de-identified] : Again, as previously discussed with him, he understands that his sensation may not fully resolve but it may take 6 to 12 months to know the full extent of his recovery.  He will follow-up on an as-needed basis.

## 2020-05-22 NOTE — PHYSICAL THERAPY INITIAL EVALUATION ADULT - GAIT TRAINING, PT EVAL
Patient called and stated that she sincerely appreciates the refill.  Thank you.   Ambulates 150 feet with RW FWB bilat LEs independently in 2-3 days. Negotiates 5 steps with handrail and cane EWB bilat LEs independently in 2-3 days

## 2020-06-29 DIAGNOSIS — N52.9 MALE ERECTILE DYSFUNCTION, UNSPECIFIED: ICD-10-CM

## 2020-06-29 RX ORDER — SILDENAFIL 20 MG/1
20 TABLET ORAL
Qty: 30 | Refills: 0 | Status: DISCONTINUED | COMMUNITY
End: 2020-06-29

## 2020-11-03 ENCOUNTER — RX RENEWAL (OUTPATIENT)
Age: 70
End: 2020-11-03

## 2020-12-27 ENCOUNTER — RX RENEWAL (OUTPATIENT)
Age: 70
End: 2020-12-27

## 2021-05-24 ENCOUNTER — APPOINTMENT (OUTPATIENT)
Dept: ORTHOPEDIC SURGERY | Facility: CLINIC | Age: 71
End: 2021-05-24

## 2021-05-24 VITALS
TEMPERATURE: 98.3 F | HEART RATE: 99 BPM | BODY MASS INDEX: 33.27 KG/M2 | SYSTOLIC BLOOD PRESSURE: 145 MMHG | WEIGHT: 207 LBS | DIASTOLIC BLOOD PRESSURE: 92 MMHG | HEIGHT: 66 IN

## 2021-05-24 DIAGNOSIS — M51.26 OTHER INTERVERTEBRAL DISC DISPLACEMENT, LUMBAR REGION: ICD-10-CM

## 2021-05-24 DIAGNOSIS — Z98.890 OTHER SPECIFIED POSTPROCEDURAL STATES: ICD-10-CM

## 2021-05-26 PROBLEM — Z98.890 STATUS POST LUMBAR MICRODISCECTOMY: Status: ACTIVE | Noted: 2019-12-11

## 2021-06-02 ENCOUNTER — APPOINTMENT (OUTPATIENT)
Dept: ORTHOPEDIC SURGERY | Facility: CLINIC | Age: 71
End: 2021-06-02

## 2021-06-14 ENCOUNTER — APPOINTMENT (OUTPATIENT)
Dept: ORTHOPEDIC SURGERY | Facility: CLINIC | Age: 71
End: 2021-06-14
Payer: MEDICARE

## 2021-06-14 VITALS — DIASTOLIC BLOOD PRESSURE: 84 MMHG | HEART RATE: 96 BPM | TEMPERATURE: 98.1 F | SYSTOLIC BLOOD PRESSURE: 138 MMHG

## 2021-06-14 DIAGNOSIS — Z98.1 ARTHRODESIS STATUS: ICD-10-CM

## 2021-06-14 DIAGNOSIS — M54.16 RADICULOPATHY, LUMBAR REGION: ICD-10-CM

## 2021-06-14 PROCEDURE — 99214 OFFICE O/P EST MOD 30 MIN: CPT

## 2021-06-21 ENCOUNTER — APPOINTMENT (OUTPATIENT)
Dept: ORTHOPEDIC SURGERY | Facility: CLINIC | Age: 71
End: 2021-06-21
Payer: MEDICARE

## 2021-06-21 VITALS
HEIGHT: 66 IN | HEART RATE: 99 BPM | SYSTOLIC BLOOD PRESSURE: 147 MMHG | BODY MASS INDEX: 36.96 KG/M2 | DIASTOLIC BLOOD PRESSURE: 83 MMHG | WEIGHT: 230 LBS

## 2021-06-21 PROCEDURE — 72082 X-RAY EXAM ENTIRE SPI 2/3 VW: CPT

## 2021-06-21 PROCEDURE — 99214 OFFICE O/P EST MOD 30 MIN: CPT

## 2021-06-21 NOTE — HISTORY OF PRESENT ILLNESS
[de-identified] : Mr. JOSEPH WHITE  is a 71 year old male who presents with a year and a half of right buttock lateral thigh pain.  He also has diffuse right leg numbness.  His posture is more flexed forward.  He goes to the chiropractor twice a week which provides temporary relief.   Normal bowel and bladder control.   Denies any recent fevers, chills, sweats, weight loss, or infection.\par \par The patients past medical history, past surgical history, medications, allergies, and social history were reviewed by me today with the patient and documented accordingly.  In addition, the patient's family history, which is noncontributory to their visit, was also reviewed.\par

## 2021-06-21 NOTE — DISCUSSION/SUMMARY
[de-identified] : We discussed further treatment options.  We discussed the role of surgery.  I have explained this would be an extensive undertaking with a multilevel decompression and fusion.  Risks and benefits were discussed at length.  At this point he feels his symptoms are tolerable.  He would like to try some additional physical therapy.  He will follow up with me afterwards.

## 2021-06-21 NOTE — PHYSICAL EXAM
[Antalgic] : antalgic [Stooped] : stooped [de-identified] : Examination of the lumbar spine reveals no midline tenderness palpation, step-offs, or skin lesions. Decreased range of motion with respect to flexion, extension, lateral bending, and rotation. No tenderness to palpation of the sciatic notch. No tenderness palpation of the bilateral greater trochanters. No pain with passive internal/external rotation of the hips. No instability of bilateral lower extremities.  Negative CLAY. Negative straight leg raise bilaterally. No bowstring. Negative femoral stretch. 5 out of 5 iliopsoas, hip abductors, hips adductors, quadriceps, hamstrings, gastrocsoleus, tibialis anterior, extensor hallucis longus, peroneals. Grossly intact sensation to light touch bilateral lower extremities. 1+ patellar and Achilles reflexes. Downgoing Babinski. No clonus. Intact proprioception. Palpable pulses. No skin lesion and no edema on the right and left lower extremities. [de-identified] : Lumbar MRI reveals multilevel spinal stenosis.  Prior decompression fusion L4-5.  Scoliosis and kyphosis.\par \par AP lateral scoliosis x-rays demonstrates kyphoscoliosis with positive sagittal balance

## 2021-07-07 ENCOUNTER — APPOINTMENT (OUTPATIENT)
Dept: ORTHOPEDIC SURGERY | Facility: CLINIC | Age: 71
End: 2021-07-07
Payer: MEDICARE

## 2021-07-07 DIAGNOSIS — M16.0 BILATERAL PRIMARY OSTEOARTHRITIS OF HIP: ICD-10-CM

## 2021-07-07 PROCEDURE — 99214 OFFICE O/P EST MOD 30 MIN: CPT

## 2021-07-07 PROCEDURE — 73502 X-RAY EXAM HIP UNI 2-3 VIEWS: CPT | Mod: LT

## 2021-07-19 RX ORDER — NAPROXEN 500 MG/1
500 TABLET, DELAYED RELEASE ORAL
Qty: 40 | Refills: 0 | Status: DISCONTINUED | COMMUNITY
Start: 2021-07-12 | End: 2021-07-19

## 2021-07-26 ENCOUNTER — NON-APPOINTMENT (OUTPATIENT)
Age: 71
End: 2021-07-26

## 2021-08-11 RX ORDER — CELECOXIB 200 MG/1
200 CAPSULE ORAL
Qty: 40 | Refills: 1 | Status: ACTIVE | COMMUNITY
Start: 2021-08-11 | End: 1900-01-01

## 2021-09-02 ENCOUNTER — RX RENEWAL (OUTPATIENT)
Age: 71
End: 2021-09-02

## 2021-10-12 NOTE — ASU PREOP CHECKLIST - NOTHING BY MOUTH SINCE
02-Sep-2019 18:00 Full Thickness Lip Wedge Repair (Flap) Text: Given the location of the defect and the proximity to free margins a full thickness wedge repair was deemed most appropriate.  Using a sterile surgical marker, the appropriate repair was drawn incorporating the defect and placing the expected incisions perpendicular to the vermilion border.  The vermilion border was also meticulously outlined to ensure appropriate reapproximation during the repair.  The area thus outlined was incised through and through with a #15 scalpel blade.  The muscularis and dermis were reaproximated with deep sutures following hemostasis. Care was taken to realign the vermilion border before proceeding with the superficial closure.  Once the vermilion was realigned the superfical and mucosal closure was finished.

## 2021-11-09 ENCOUNTER — APPOINTMENT (OUTPATIENT)
Dept: INTERNAL MEDICINE | Facility: CLINIC | Age: 71
End: 2021-11-09
Payer: MEDICARE

## 2021-11-09 VITALS
BODY MASS INDEX: 38.25 KG/M2 | HEART RATE: 105 BPM | DIASTOLIC BLOOD PRESSURE: 90 MMHG | OXYGEN SATURATION: 96 % | HEIGHT: 66 IN | SYSTOLIC BLOOD PRESSURE: 130 MMHG | WEIGHT: 238 LBS

## 2021-11-09 DIAGNOSIS — R97.20 ELEVATED PROSTATE, SPECIFIC ANTIGEN [PSA]: ICD-10-CM

## 2021-11-09 DIAGNOSIS — M62.830 MUSCLE SPASM OF BACK: ICD-10-CM

## 2021-11-09 PROCEDURE — 99213 OFFICE O/P EST LOW 20 MIN: CPT

## 2021-12-17 PROBLEM — M62.830 MUSCLE SPASM OF BACK: Status: ACTIVE | Noted: 2017-03-28

## 2021-12-17 NOTE — PLAN
[FreeTextEntry1] : Pt will work on optimizing approach to diet/exercise which he has learned before - to limit likelihood of reinjuring back.  To extract him from current notable episode of pain and inflammation, would prescribe medrol dose leena.  OK  once healed to use stendra for ed.

## 2021-12-17 NOTE — PHYSICAL EXAM
[Well Nourished] : well nourished [Well Developed] : well developed [Well-Appearing] : well-appearing [Normal Sclera/Conjunctiva] : normal sclera/conjunctiva [PERRL] : pupils equal round and reactive to light [EOMI] : extraocular movements intact [Normal Outer Ear/Nose] : the outer ears and nose were normal in appearance [Normal Oropharynx] : the oropharynx was normal [No JVD] : no jugular venous distention [No Lymphadenopathy] : no lymphadenopathy [Supple] : supple [Thyroid Normal, No Nodules] : the thyroid was normal and there were no nodules present [No Respiratory Distress] : no respiratory distress  [No Accessory Muscle Use] : no accessory muscle use [Clear to Auscultation] : lungs were clear to auscultation bilaterally [Normal Rate] : normal rate  [Regular Rhythm] : with a regular rhythm [Normal S1, S2] : normal S1 and S2 [No Murmur] : no murmur heard [No Carotid Bruits] : no carotid bruits [No Abdominal Bruit] : a ~M bruit was not heard ~T in the abdomen [No Varicosities] : no varicosities [Pedal Pulses Present] : the pedal pulses are present [No Edema] : there was no peripheral edema [No Palpable Aorta] : no palpable aorta [No Extremity Clubbing/Cyanosis] : no extremity clubbing/cyanosis [Soft] : abdomen soft [Non Tender] : non-tender [Non-distended] : non-distended [No Masses] : no abdominal mass palpated [No HSM] : no HSM [Normal Bowel Sounds] : normal bowel sounds [Normal Posterior Cervical Nodes] : no posterior cervical lymphadenopathy [Normal Anterior Cervical Nodes] : no anterior cervical lymphadenopathy [No CVA Tenderness] : no CVA  tenderness [No Joint Swelling] : no joint swelling [Grossly Normal Strength/Tone] : grossly normal strength/tone [No Rash] : no rash [Coordination Grossly Intact] : coordination grossly intact [No Focal Deficits] : no focal deficits [Normal Gait] : normal gait [Deep Tendon Reflexes (DTR)] : deep tendon reflexes were 2+ and symmetric [Normal Affect] : the affect was normal [Normal Insight/Judgement] : insight and judgment were intact [de-identified] : walking with notable stoop; difficult shifting positions; spasmodic mm. paraspinal lumbar

## 2021-12-17 NOTE — HISTORY OF PRESENT ILLNESS
[FreeTextEntry1] : back pain [de-identified] : Pt reinjured back and has been having difficulty straightening up and walking easily.  has been in touch with orthopedists and was given some nsaid's without real improvement as yet.  he has made plans to alter exercise regimen and keep lid on weight going forward so that does not end up still more disabled with this.  he notes that has had real improvements in past from short burst of steroids in situation such as this and is requesting this today. also concerned about erectile function and looking for med tx.

## 2022-01-08 ENCOUNTER — TRANSCRIPTION ENCOUNTER (OUTPATIENT)
Age: 72
End: 2022-01-08

## 2022-02-07 ENCOUNTER — NON-APPOINTMENT (OUTPATIENT)
Age: 72
End: 2022-02-07

## 2022-02-07 ENCOUNTER — APPOINTMENT (OUTPATIENT)
Dept: ORTHOPEDIC SURGERY | Facility: CLINIC | Age: 72
End: 2022-02-07
Payer: MEDICARE

## 2022-02-07 VITALS — HEART RATE: 84 BPM | DIASTOLIC BLOOD PRESSURE: 87 MMHG | SYSTOLIC BLOOD PRESSURE: 147 MMHG

## 2022-02-07 DIAGNOSIS — M48.07 SPINAL STENOSIS, LUMBOSACRAL REGION: ICD-10-CM

## 2022-02-07 PROCEDURE — 99213 OFFICE O/P EST LOW 20 MIN: CPT

## 2022-02-07 RX ORDER — CALCIUM CARBONATE/VITAMIN D3 600 MG-10
TABLET ORAL
Refills: 0 | Status: ACTIVE | COMMUNITY

## 2022-03-02 ENCOUNTER — APPOINTMENT (OUTPATIENT)
Dept: SPINE | Facility: CLINIC | Age: 72
End: 2022-03-02
Payer: MEDICARE

## 2022-03-02 VITALS
OXYGEN SATURATION: 92 % | WEIGHT: 220 LBS | HEIGHT: 68.5 IN | SYSTOLIC BLOOD PRESSURE: 136 MMHG | DIASTOLIC BLOOD PRESSURE: 83 MMHG | BODY MASS INDEX: 32.96 KG/M2 | HEART RATE: 103 BPM

## 2022-03-02 DIAGNOSIS — M40.209 UNSPECIFIED KYPHOSIS, SITE UNSPECIFIED: ICD-10-CM

## 2022-03-02 DIAGNOSIS — M48.061 SPINAL STENOSIS, LUMBAR REGION WITHOUT NEUROGENIC CLAUDICATION: ICD-10-CM

## 2022-03-02 DIAGNOSIS — M54.16 RADICULOPATHY, LUMBAR REGION: ICD-10-CM

## 2022-03-02 DIAGNOSIS — M41.55 OTHER SECONDARY SCOLIOSIS, THORACOLUMBAR REGION: ICD-10-CM

## 2022-03-02 PROCEDURE — 99204 OFFICE O/P NEW MOD 45 MIN: CPT

## 2022-03-02 RX ORDER — AVANAFIL 50 MG/1
50 TABLET ORAL
Qty: 10 | Refills: 0 | Status: DISCONTINUED | COMMUNITY
Start: 2021-11-09 | End: 2022-03-02

## 2022-03-02 RX ORDER — METHYLPREDNISOLONE 4 MG/1
4 TABLET ORAL
Qty: 1 | Refills: 0 | Status: DISCONTINUED | COMMUNITY
Start: 2021-07-26 | End: 2022-03-02

## 2022-03-02 RX ORDER — TADALAFIL 20 MG/1
20 TABLET ORAL
Qty: 10 | Refills: 3 | Status: DISCONTINUED | COMMUNITY
Start: 2020-06-29 | End: 2022-03-02

## 2022-03-02 RX ORDER — METHYLPREDNISOLONE 4 MG/1
4 TABLET ORAL
Qty: 1 | Refills: 0 | Status: DISCONTINUED | COMMUNITY
Start: 2021-11-09 | End: 2022-03-02

## 2022-03-02 RX ORDER — DICLOFENAC SODIUM 75 MG/1
75 TABLET, DELAYED RELEASE ORAL
Qty: 30 | Refills: 0 | Status: DISCONTINUED | COMMUNITY
Start: 2021-07-19 | End: 2022-03-02

## 2022-03-02 RX ORDER — ASPIRIN 81 MG/1
81 TABLET, COATED ORAL
Refills: 0 | Status: DISCONTINUED | COMMUNITY
Start: 2018-04-01 | End: 2022-03-02

## 2022-03-02 RX ORDER — METHOCARBAMOL 500 MG/1
500 TABLET, FILM COATED ORAL 3 TIMES DAILY
Qty: 30 | Refills: 0 | Status: DISCONTINUED | COMMUNITY
Start: 2021-07-23 | End: 2022-03-02

## 2022-03-02 RX ORDER — GABAPENTIN 100 MG/1
100 CAPSULE ORAL
Qty: 30 | Refills: 0 | Status: DISCONTINUED | COMMUNITY
Start: 2021-07-12 | End: 2022-03-02

## 2022-03-02 RX ORDER — ECONAZOLE NITRATE 10 MG/G
1 CREAM TOPICAL
Qty: 85 | Refills: 0 | Status: DISCONTINUED | COMMUNITY
Start: 2021-05-07 | End: 2022-03-02

## 2022-03-02 RX ORDER — ACETAMINOPHEN AND CODEINE 300; 30 MG/1; MG/1
300-30 TABLET ORAL
Qty: 5 | Refills: 0 | Status: DISCONTINUED | COMMUNITY
Start: 2020-01-21 | End: 2022-03-02

## 2022-03-02 RX ORDER — GABAPENTIN 300 MG/1
300 CAPSULE ORAL TWICE DAILY
Qty: 60 | Refills: 1 | Status: DISCONTINUED | COMMUNITY
Start: 2021-07-20 | End: 2022-03-02

## 2022-11-22 ENCOUNTER — RX RENEWAL (OUTPATIENT)
Age: 72
End: 2022-11-22

## 2023-02-15 ENCOUNTER — NON-APPOINTMENT (OUTPATIENT)
Age: 73
End: 2023-02-15

## 2023-02-15 ENCOUNTER — APPOINTMENT (OUTPATIENT)
Dept: INTERNAL MEDICINE | Facility: CLINIC | Age: 73
End: 2023-02-15
Payer: MEDICARE

## 2023-02-15 VITALS
BODY MASS INDEX: 29.36 KG/M2 | OXYGEN SATURATION: 98 % | DIASTOLIC BLOOD PRESSURE: 74 MMHG | WEIGHT: 196 LBS | HEART RATE: 101 BPM | HEIGHT: 68.5 IN | SYSTOLIC BLOOD PRESSURE: 130 MMHG

## 2023-02-15 DIAGNOSIS — M41.9 SCOLIOSIS, UNSPECIFIED: ICD-10-CM

## 2023-02-15 DIAGNOSIS — M47.816 SPONDYLOSIS W/OUT MYELOPATHY OR RADICULOPATHY, LUMBAR REGION: ICD-10-CM

## 2023-02-15 DIAGNOSIS — N40.0 BENIGN PROSTATIC HYPERPLASIA WITHOUT LOWER URINARY TRACT SYMPMS: ICD-10-CM

## 2023-02-15 DIAGNOSIS — I10 ESSENTIAL (PRIMARY) HYPERTENSION: ICD-10-CM

## 2023-02-15 PROCEDURE — G0444 DEPRESSION SCREEN ANNUAL: CPT

## 2023-02-15 PROCEDURE — G0439: CPT

## 2023-02-15 PROCEDURE — 93000 ELECTROCARDIOGRAM COMPLETE: CPT | Mod: 59

## 2023-02-23 NOTE — PLAN
[FreeTextEntry1] : Pt doing well again with back, relatively speaking -- has been going for regular therapy led by pain mgmt team and is managing it, looking forward to achieving next functional goals.  weight reduction helpful for bp, lipids, all metabolics as well.  will check on numbers today.  will also ask pt to keep vaccinations up to speed.  to keep colonoscopy up to date.  will check psa as well.

## 2023-02-23 NOTE — HEALTH RISK ASSESSMENT
[Very Good] : ~his/her~  mood as very good [No] : In the past 12 months have you used drugs other than those required for medical reasons? No [No falls in past year] : Patient reported no falls in the past year [0] : 2) Feeling down, depressed, or hopeless: Not at all (0) [PHQ-2 Negative - No further assessment needed] : PHQ-2 Negative - No further assessment needed [de-identified] : as in hpi [de-identified] : as in hpi [de-identified] : as in hpi [Change in mental status noted] : No change in mental status noted [Language] : denies difficulty with language [Behavior] : denies difficulty with behavior [Learning/Retaining New Information] : denies difficulty learning/retaining new information [Handling Complex Tasks] : denies difficulty handling complex tasks [Reasoning] : denies difficulty with reasoning [Spatial Ability and Orientation] : denies difficulty with spatial ability and orientation [None] : None [With Family] : lives with family [Retired] : retired [] :  [# Of Children ___] : has [unfilled] children [Feels Safe at Home] : Feels safe at home [Fully functional (bathing, dressing, toileting, transferring, walking, feeding)] : Fully functional (bathing, dressing, toileting, transferring, walking, feeding) [Fully functional (using the telephone, shopping, preparing meals, housekeeping, doing laundry, using] : Fully functional and needs no help or supervision to perform IADLs (using the telephone, shopping, preparing meals, housekeeping, doing laundry, using transportation, managing medications and managing finances) [Reports changes in hearing] : Reports no changes in hearing [Reports changes in vision] : Reports no changes in vision [Reports normal functional visual acuity (ie: able to read med bottle)] : Reports normal functional visual acuity [Reports changes in dental health] : Reports no changes in dental health

## 2023-02-23 NOTE — HISTORY OF PRESENT ILLNESS
[FreeTextEntry1] : annual [de-identified] : Pt returns for first visit to office in several years.  has done well with weight loss through pandemic, bringing weight down from peak 240# all the way to 190s.  This has really helped sx at low back as well as kept metabolics in good ranges, reportedly.  he has been following with Dr. Hayley García in [place] and in conjunction with chiropractic has been really less limited by serious low back issues.  had been looking to avoid surgery and really bought into the weight loss, PT, chiropractic path.  has also kept off racquet sports for the most part -- goal is to get back in and play with moderate intensity.  urinary flow has been ok with alfuzosin on board.  denies major intercurrent illness - taking care during covid.

## 2023-02-23 NOTE — PHYSICAL EXAM
English [Well Nourished] : well nourished [Well Developed] : well developed [Well-Appearing] : well-appearing [Normal Sclera/Conjunctiva] : normal sclera/conjunctiva [PERRL] : pupils equal round and reactive to light [EOMI] : extraocular movements intact [Normal Outer Ear/Nose] : the outer ears and nose were normal in appearance [Normal Oropharynx] : the oropharynx was normal [No JVD] : no jugular venous distention [No Lymphadenopathy] : no lymphadenopathy [Supple] : supple [Thyroid Normal, No Nodules] : the thyroid was normal and there were no nodules present [No Respiratory Distress] : no respiratory distress  [No Accessory Muscle Use] : no accessory muscle use [Clear to Auscultation] : lungs were clear to auscultation bilaterally [Normal Rate] : normal rate  [Regular Rhythm] : with a regular rhythm [Normal S1, S2] : normal S1 and S2 [No Murmur] : no murmur heard [No Carotid Bruits] : no carotid bruits [No Abdominal Bruit] : a ~M bruit was not heard ~T in the abdomen [No Varicosities] : no varicosities [Pedal Pulses Present] : the pedal pulses are present [No Edema] : there was no peripheral edema [No Palpable Aorta] : no palpable aorta [No Extremity Clubbing/Cyanosis] : no extremity clubbing/cyanosis [Soft] : abdomen soft [Non Tender] : non-tender [Non-distended] : non-distended [No Masses] : no abdominal mass palpated [No HSM] : no HSM [Normal Bowel Sounds] : normal bowel sounds [No CVA Tenderness] : no CVA  tenderness [No Joint Swelling] : no joint swelling [Grossly Normal Strength/Tone] : grossly normal strength/tone [No Rash] : no rash [Coordination Grossly Intact] : coordination grossly intact [No Focal Deficits] : no focal deficits [Normal Gait] : normal gait [Deep Tendon Reflexes (DTR)] : deep tendon reflexes were 2+ and symmetric [Normal Affect] : the affect was normal [Normal Insight/Judgement] : insight and judgment were intact [de-identified] : walking with notable stoop; difficult shifting positions; spasmodic mm. paraspinal lumbar

## 2023-03-22 LAB
ALBUMIN SERPL ELPH-MCNC: 4.5 G/DL
ALP BLD-CCNC: 133 U/L
ALT SERPL-CCNC: 30 U/L
ANION GAP SERPL CALC-SCNC: 14 MMOL/L
AST SERPL-CCNC: 28 U/L
BASOPHILS # BLD AUTO: 0.06 K/UL
BASOPHILS NFR BLD AUTO: 0.8 %
BILIRUB SERPL-MCNC: 0.6 MG/DL
BUN SERPL-MCNC: 19 MG/DL
CALCIUM SERPL-MCNC: 9.4 MG/DL
CHLORIDE SERPL-SCNC: 108 MMOL/L
CHOLEST SERPL-MCNC: 115 MG/DL
CO2 SERPL-SCNC: 21 MMOL/L
CREAT SERPL-MCNC: 1.09 MG/DL
EGFR: 73 ML/MIN/1.73M2
EOSINOPHIL # BLD AUTO: 0.12 K/UL
EOSINOPHIL NFR BLD AUTO: 1.6 %
ESTIMATED AVERAGE GLUCOSE: 117 MG/DL
GLUCOSE SERPL-MCNC: 112 MG/DL
HBA1C MFR BLD HPLC: 5.7 %
HCT VFR BLD CALC: 49.9 %
HDLC SERPL-MCNC: 58 MG/DL
HGB BLD-MCNC: 16.7 G/DL
IMM GRANULOCYTES NFR BLD AUTO: 0.3 %
LDLC SERPL CALC-MCNC: 44 MG/DL
LYMPHOCYTES # BLD AUTO: 1.64 K/UL
LYMPHOCYTES NFR BLD AUTO: 22.2 %
MAN DIFF?: NORMAL
MCHC RBC-ENTMCNC: 30.9 PG
MCHC RBC-ENTMCNC: 33.5 GM/DL
MCV RBC AUTO: 92.2 FL
MONOCYTES # BLD AUTO: 0.73 K/UL
MONOCYTES NFR BLD AUTO: 9.9 %
NEUTROPHILS # BLD AUTO: 4.81 K/UL
NEUTROPHILS NFR BLD AUTO: 65.2 %
NONHDLC SERPL-MCNC: 57 MG/DL
PLATELET # BLD AUTO: 147 K/UL
POTASSIUM SERPL-SCNC: 5 MMOL/L
PROT SERPL-MCNC: 6.9 G/DL
PSA SERPL-MCNC: 2.3 NG/ML
RBC # BLD: 5.41 M/UL
RBC # FLD: 13.2 %
SODIUM SERPL-SCNC: 143 MMOL/L
TRIGL SERPL-MCNC: 67 MG/DL
WBC # FLD AUTO: 7.38 K/UL

## 2023-03-23 RX ORDER — VARDENAFIL 20 MG/1
20 TABLET, FILM COATED ORAL
Qty: 10 | Refills: 2 | Status: DISCONTINUED | COMMUNITY
Start: 2023-03-22 | End: 2023-03-23

## 2023-04-26 NOTE — OCCUPATIONAL THERAPY INITIAL EVALUATION ADULT - GROOMING, PREVIOUS LEVEL OF FUNCTION, OT EVAL
,[email or web address]

## 2023-08-16 ENCOUNTER — RX RENEWAL (OUTPATIENT)
Age: 73
End: 2023-08-16

## 2023-10-09 NOTE — H&P PST ADULT - MUSCULOSKELETAL
Assume patient care- report from [place], [place]. details… detailed exam decreased ROM due to pain/no joint warmth/no calf tenderness/left shoulder/no joint swelling/no joint erythema

## 2024-01-15 ENCOUNTER — RX RENEWAL (OUTPATIENT)
Age: 74
End: 2024-01-15

## 2024-01-22 ENCOUNTER — RX RENEWAL (OUTPATIENT)
Age: 74
End: 2024-01-22

## 2024-06-28 ENCOUNTER — OUTPATIENT (OUTPATIENT)
Dept: OUTPATIENT SERVICES | Facility: HOSPITAL | Age: 74
LOS: 1 days | End: 2024-06-28

## 2024-06-28 ENCOUNTER — APPOINTMENT (OUTPATIENT)
Dept: INTERNAL MEDICINE | Facility: CLINIC | Age: 74
End: 2024-06-28

## 2024-06-28 VITALS
RESPIRATION RATE: 16 BRPM | WEIGHT: 190 LBS | HEIGHT: 68.5 IN | SYSTOLIC BLOOD PRESSURE: 124 MMHG | DIASTOLIC BLOOD PRESSURE: 78 MMHG | HEART RATE: 96 BPM | BODY MASS INDEX: 28.46 KG/M2 | OXYGEN SATURATION: 96 %

## 2024-06-28 DIAGNOSIS — Z98.890 OTHER SPECIFIED POSTPROCEDURAL STATES: Chronic | ICD-10-CM

## 2024-06-28 DIAGNOSIS — Z00.00 ENCOUNTER FOR GENERAL ADULT MEDICAL EXAMINATION W/OUT ABNORMAL FINDINGS: ICD-10-CM

## 2024-06-28 DIAGNOSIS — E78.5 HYPERLIPIDEMIA, UNSPECIFIED: ICD-10-CM

## 2024-06-28 DIAGNOSIS — Z90.89 ACQUIRED ABSENCE OF OTHER ORGANS: Chronic | ICD-10-CM

## 2024-06-28 LAB
ALBUMIN SERPL ELPH-MCNC: 4.2 G/DL
ALP BLD-CCNC: 121 U/L
ALT SERPL-CCNC: 14 U/L
ANION GAP SERPL CALC-SCNC: 10 MMOL/L
AST SERPL-CCNC: 20 U/L
BASOPHILS # BLD AUTO: 0.04 K/UL
BASOPHILS NFR BLD AUTO: 0.6 %
BILIRUB SERPL-MCNC: 0.4 MG/DL
BUN SERPL-MCNC: 14 MG/DL
CALCIUM SERPL-MCNC: 9.3 MG/DL
CHLORIDE SERPL-SCNC: 105 MMOL/L
CHOLEST SERPL-MCNC: 112 MG/DL
CO2 SERPL-SCNC: 25 MMOL/L
CREAT SERPL-MCNC: 0.95 MG/DL
EGFR: 85 ML/MIN/1.73M2
EOSINOPHIL # BLD AUTO: 0.12 K/UL
EOSINOPHIL NFR BLD AUTO: 1.8 %
ESTIMATED AVERAGE GLUCOSE: 111 MG/DL
FERRITIN SERPL-MCNC: 59 NG/ML
FOLATE SERPL-MCNC: >20 NG/ML
GLUCOSE SERPL-MCNC: 146 MG/DL
HBA1C MFR BLD HPLC: 5.5 %
HCT VFR BLD CALC: 43.7 %
HDLC SERPL-MCNC: 50 MG/DL
HGB BLD-MCNC: 14.6 G/DL
IMM GRANULOCYTES NFR BLD AUTO: 0.4 %
IRON SATN MFR SERPL: 20 %
IRON SERPL-MCNC: 60 UG/DL
LDLC SERPL CALC-MCNC: 47 MG/DL
LYMPHOCYTES # BLD AUTO: 1.23 K/UL
LYMPHOCYTES NFR BLD AUTO: 18 %
MAN DIFF?: NORMAL
MCHC RBC-ENTMCNC: 29.5 PG
MCHC RBC-ENTMCNC: 33.4 GM/DL
MCV RBC AUTO: 88.3 FL
MONOCYTES # BLD AUTO: 0.76 K/UL
MONOCYTES NFR BLD AUTO: 11.1 %
NEUTROPHILS # BLD AUTO: 4.67 K/UL
NEUTROPHILS NFR BLD AUTO: 68.1 %
NONHDLC SERPL-MCNC: 63 MG/DL
PLATELET # BLD AUTO: 202 K/UL
POTASSIUM SERPL-SCNC: 4.2 MMOL/L
PROT SERPL-MCNC: 6.3 G/DL
PSA SERPL-MCNC: 1.83 NG/ML
RBC # BLD: 4.95 M/UL
RBC # FLD: 13.4 %
SODIUM SERPL-SCNC: 140 MMOL/L
TIBC SERPL-MCNC: 295 UG/DL
TRIGL SERPL-MCNC: 78 MG/DL
TSH SERPL-ACNC: 2.64 UIU/ML
UIBC SERPL-MCNC: 236 UG/DL
VIT B12 SERPL-MCNC: 839 PG/ML
WBC # FLD AUTO: 6.85 K/UL

## 2024-06-28 PROCEDURE — G0439: CPT

## 2024-09-27 NOTE — H&P PST ADULT - PRO PAIN LIFE ADAPT
Spoke with pt.    Pt informed of lab/PCP recommendations. Pt verbalized understanding/ agreement with plan of care. No further questions at this time.    none

## 2025-01-23 NOTE — ASU PREOP CHECKLIST - WARM FLUIDS/WARM BLANKETS
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

## 2025-03-05 ENCOUNTER — APPOINTMENT (OUTPATIENT)
Dept: ORTHOPEDIC SURGERY | Facility: CLINIC | Age: 75
End: 2025-03-05
Payer: MEDICARE

## 2025-03-05 VITALS — HEIGHT: 69 IN | BODY MASS INDEX: 29.47 KG/M2 | WEIGHT: 199 LBS

## 2025-03-05 DIAGNOSIS — G56.01 CARPAL TUNNEL SYNDROME, RIGHT UPPER LIMB: ICD-10-CM

## 2025-03-05 PROCEDURE — 99204 OFFICE O/P NEW MOD 45 MIN: CPT

## 2025-03-05 RX ORDER — VITS A,C,E/LUTEIN/MINERALS 300MCG-200
TABLET ORAL
Refills: 0 | Status: ACTIVE | COMMUNITY

## 2025-03-05 RX ORDER — IBUPROFEN 200 MG/1
TABLET, COATED ORAL
Refills: 0 | Status: ACTIVE | COMMUNITY

## 2025-03-10 ENCOUNTER — OUTPATIENT (OUTPATIENT)
Dept: OUTPATIENT SERVICES | Facility: HOSPITAL | Age: 75
LOS: 1 days | End: 2025-03-10
Payer: MEDICARE

## 2025-03-10 VITALS
RESPIRATION RATE: 14 BRPM | HEIGHT: 66 IN | DIASTOLIC BLOOD PRESSURE: 61 MMHG | SYSTOLIC BLOOD PRESSURE: 99 MMHG | HEART RATE: 93 BPM | OXYGEN SATURATION: 96 % | TEMPERATURE: 98 F | WEIGHT: 203.27 LBS

## 2025-03-10 DIAGNOSIS — Z98.890 OTHER SPECIFIED POSTPROCEDURAL STATES: Chronic | ICD-10-CM

## 2025-03-10 DIAGNOSIS — Z01.818 ENCOUNTER FOR OTHER PREPROCEDURAL EXAMINATION: ICD-10-CM

## 2025-03-10 DIAGNOSIS — G56.01 CARPAL TUNNEL SYNDROME, RIGHT UPPER LIMB: ICD-10-CM

## 2025-03-10 DIAGNOSIS — Z98.1 ARTHRODESIS STATUS: Chronic | ICD-10-CM

## 2025-03-10 DIAGNOSIS — Z90.89 ACQUIRED ABSENCE OF OTHER ORGANS: Chronic | ICD-10-CM

## 2025-03-10 PROBLEM — G89.29 OTHER CHRONIC PAIN: Chronic | Status: INACTIVE | Noted: 2017-03-02 | Resolved: 2025-03-10

## 2025-03-10 LAB
ANION GAP SERPL CALC-SCNC: 7 MMOL/L — SIGNIFICANT CHANGE UP (ref 5–17)
BUN SERPL-MCNC: 39 MG/DL — HIGH (ref 7–23)
CALCIUM SERPL-MCNC: 8.6 MG/DL — SIGNIFICANT CHANGE UP (ref 8.4–10.5)
CHLORIDE SERPL-SCNC: 110 MMOL/L — HIGH (ref 96–108)
CO2 SERPL-SCNC: 27 MMOL/L — SIGNIFICANT CHANGE UP (ref 22–31)
CREAT SERPL-MCNC: 1.32 MG/DL — HIGH (ref 0.5–1.3)
EGFR: 57 ML/MIN/1.73M2 — LOW
EGFR: 57 ML/MIN/1.73M2 — LOW
GLUCOSE SERPL-MCNC: 116 MG/DL — HIGH (ref 70–99)
HCT VFR BLD CALC: 33.8 % — LOW (ref 39–50)
HGB BLD-MCNC: 11.5 G/DL — LOW (ref 13–17)
MCHC RBC-ENTMCNC: 30 PG — SIGNIFICANT CHANGE UP (ref 27–34)
MCHC RBC-ENTMCNC: 34 G/DL — SIGNIFICANT CHANGE UP (ref 32–36)
MCV RBC AUTO: 88.3 FL — SIGNIFICANT CHANGE UP (ref 80–100)
NRBC BLD AUTO-RTO: 0 /100 WBCS — SIGNIFICANT CHANGE UP (ref 0–0)
PLATELET # BLD AUTO: 181 K/UL — SIGNIFICANT CHANGE UP (ref 150–400)
POTASSIUM SERPL-MCNC: 4.4 MMOL/L — SIGNIFICANT CHANGE UP (ref 3.5–5.3)
POTASSIUM SERPL-SCNC: 4.4 MMOL/L — SIGNIFICANT CHANGE UP (ref 3.5–5.3)
RBC # BLD: 3.83 M/UL — LOW (ref 4.2–5.8)
RBC # FLD: 13.6 % — SIGNIFICANT CHANGE UP (ref 10.3–14.5)
SODIUM SERPL-SCNC: 144 MMOL/L — SIGNIFICANT CHANGE UP (ref 135–145)
WBC # BLD: 6.89 K/UL — SIGNIFICANT CHANGE UP (ref 3.8–10.5)
WBC # FLD AUTO: 6.89 K/UL — SIGNIFICANT CHANGE UP (ref 3.8–10.5)

## 2025-03-10 PROCEDURE — 93010 ELECTROCARDIOGRAM REPORT: CPT

## 2025-03-10 PROCEDURE — 36415 COLL VENOUS BLD VENIPUNCTURE: CPT

## 2025-03-10 PROCEDURE — 85027 COMPLETE CBC AUTOMATED: CPT

## 2025-03-10 PROCEDURE — 93005 ELECTROCARDIOGRAM TRACING: CPT

## 2025-03-10 PROCEDURE — G0463: CPT

## 2025-03-10 PROCEDURE — 80048 BASIC METABOLIC PNL TOTAL CA: CPT

## 2025-03-10 RX ORDER — MELOXICAM 15 MG/1
0.5 TABLET ORAL
Refills: 0 | DISCHARGE

## 2025-03-10 RX ORDER — LORATADINE 5 MG/5ML
1 SOLUTION ORAL
Refills: 0 | DISCHARGE

## 2025-03-10 RX ORDER — CYST/ALA/Q10/PHOS.SER/DHA/BROC 100-20-50
0 POWDER (GRAM) ORAL
Refills: 0 | DISCHARGE

## 2025-03-10 RX ORDER — B1/B2/B3/B5/B6/B12/VIT C/FOLIC 500-0.5 MG
1 TABLET ORAL
Refills: 0 | DISCHARGE

## 2025-03-10 NOTE — H&P PST ADULT - NSICDXPASTMEDICALHX_GEN_ALL_CORE_FT
PAST MEDICAL HISTORY:  BPH without urinary obstruction     Chronic back pain     Chronic GERD     COVID-19 vaccine series completed     Diverticulosis     Erectile dysfunction     History of kidney stones     History of sinusitis     Hypertension     Lumbar radiculopathy     Lumbar stenosis     Nocturia     Obesity (BMI 30-39.9)     Obesity (BMI 30.0-34.9)     Osteoarthritis     Right carpal tunnel syndrome     Scoliosis     Seasonal allergies     Urinary frequency

## 2025-03-10 NOTE — H&P PST ADULT - PROBLEM SELECTOR PLAN 1
endoscopic right carpal tunnel release. medical clearance requested. surgical wash instructions reviewed and verbalized understanding. instructed to stop MVI, meloxicam, advil, fish oil, nitric oxide boost and sildenafil 1 week prior to surgery and take amlodipine AM of surgery with sips of water.

## 2025-03-10 NOTE — H&P PST ADULT - HISTORY OF PRESENT ILLNESS
76 yo male presents s/p ;eft carpal tunnel release 2020 with c/o right hand numbness and tingling since then. Symptoms then were intermittent and tolerable. States that in the last month symptoms are now constant in nature. Denies prior treatment. Also reports decreased fine motor coordination and decreased strength. Pain 10/10 with or without using the hand.

## 2025-03-10 NOTE — H&P PST ADULT - NSICDXPASTSURGICALHX_GEN_ALL_CORE_FT
PAST SURGICAL HISTORY:  History of arthroscopy of left knee     History of arthroscopy of left shoulder     History of arthroscopy of right knee     History of arthroscopy of right shoulder     History of carpal tunnel surgery of left wrist     History of lumbar fusion     History of lumbar laminectomy     History of tonsillectomy childhood    Status post left foot surgery

## 2025-03-10 NOTE — H&P PST ADULT - NSICDXFAMILYHX_GEN_ALL_CORE_FT
FAMILY HISTORY:  Father  Still living? No  Family history of heart disease, Age at diagnosis: Age Unknown    Mother  Still living? No  Family history of emphysema, Age at diagnosis: Age Unknown

## 2025-03-10 NOTE — H&P PST ADULT - MUSCULOSKELETAL
ROM intact/no joint swelling/no joint erythema/no joint warmth/no calf tenderness/strength 5/5 bilateral lower extremities/decreased strength details…

## 2025-03-10 NOTE — H&P PST ADULT - NSANTHOSAYNRD_GEN_A_CORE
neck 17 inches/No. STEVEN screening performed.  STOP BANG Legend: 0-2 = LOW Risk; 3-4 = INTERMEDIATE Risk; 5-8 = HIGH Risk

## 2025-03-14 ENCOUNTER — NON-APPOINTMENT (OUTPATIENT)
Age: 75
End: 2025-03-14

## 2025-03-19 ENCOUNTER — NON-APPOINTMENT (OUTPATIENT)
Age: 75
End: 2025-03-19

## 2025-03-19 ENCOUNTER — APPOINTMENT (OUTPATIENT)
Dept: INTERNAL MEDICINE | Facility: CLINIC | Age: 75
End: 2025-03-19
Payer: MEDICARE

## 2025-03-19 ENCOUNTER — OUTPATIENT (OUTPATIENT)
Dept: OUTPATIENT SERVICES | Facility: HOSPITAL | Age: 75
LOS: 1 days | End: 2025-03-19

## 2025-03-19 ENCOUNTER — LABORATORY RESULT (OUTPATIENT)
Age: 75
End: 2025-03-19

## 2025-03-19 VITALS
HEIGHT: 69 IN | HEART RATE: 92 BPM | DIASTOLIC BLOOD PRESSURE: 77 MMHG | OXYGEN SATURATION: 97 % | SYSTOLIC BLOOD PRESSURE: 128 MMHG | WEIGHT: 210 LBS | BODY MASS INDEX: 31.1 KG/M2

## 2025-03-19 DIAGNOSIS — Z98.890 OTHER SPECIFIED POSTPROCEDURAL STATES: Chronic | ICD-10-CM

## 2025-03-19 DIAGNOSIS — Z01.818 ENCOUNTER FOR OTHER PREPROCEDURAL EXAMINATION: ICD-10-CM

## 2025-03-19 DIAGNOSIS — Z90.89 ACQUIRED ABSENCE OF OTHER ORGANS: Chronic | ICD-10-CM

## 2025-03-19 DIAGNOSIS — I10 ESSENTIAL (PRIMARY) HYPERTENSION: ICD-10-CM

## 2025-03-19 DIAGNOSIS — Z98.1 ARTHRODESIS STATUS: Chronic | ICD-10-CM

## 2025-03-19 PROBLEM — M19.90 UNSPECIFIED OSTEOARTHRITIS, UNSPECIFIED SITE: Chronic | Status: ACTIVE | Noted: 2025-03-10

## 2025-03-19 PROBLEM — R35.1 NOCTURIA: Chronic | Status: ACTIVE | Noted: 2025-03-10

## 2025-03-19 PROBLEM — K57.90 DIVERTICULOSIS OF INTESTINE, PART UNSPECIFIED, WITHOUT PERFORATION OR ABSCESS WITHOUT BLEEDING: Chronic | Status: ACTIVE | Noted: 2025-03-10

## 2025-03-19 PROBLEM — Z87.442 PERSONAL HISTORY OF URINARY CALCULI: Chronic | Status: ACTIVE | Noted: 2025-03-10

## 2025-03-19 PROBLEM — E66.811 OBESITY, CLASS 1: Chronic | Status: ACTIVE | Noted: 2025-03-10

## 2025-03-19 PROBLEM — M54.16 RADICULOPATHY, LUMBAR REGION: Chronic | Status: ACTIVE | Noted: 2025-03-10

## 2025-03-19 PROBLEM — G56.01 CARPAL TUNNEL SYNDROME, RIGHT UPPER LIMB: Chronic | Status: ACTIVE | Noted: 2025-03-10

## 2025-03-19 PROBLEM — Z92.29 PERSONAL HISTORY OF OTHER DRUG THERAPY: Chronic | Status: ACTIVE | Noted: 2025-03-10

## 2025-03-19 PROBLEM — M48.061 SPINAL STENOSIS, LUMBAR REGION WITHOUT NEUROGENIC CLAUDICATION: Chronic | Status: ACTIVE | Noted: 2025-03-10

## 2025-03-19 PROCEDURE — 99213 OFFICE O/P EST LOW 20 MIN: CPT

## 2025-03-20 DIAGNOSIS — D50.9 IRON DEFICIENCY ANEMIA, UNSPECIFIED: ICD-10-CM

## 2025-03-20 LAB
ANION GAP SERPL CALC-SCNC: 12 MMOL/L
APPEARANCE: CLEAR
BASOPHILS # BLD AUTO: 0.05 K/UL
BASOPHILS NFR BLD AUTO: 0.8 %
BILIRUBIN URINE: NEGATIVE
BLOOD URINE: NEGATIVE
BUN SERPL-MCNC: 25 MG/DL
CALCIUM SERPL-MCNC: 8.5 MG/DL
CHLORIDE SERPL-SCNC: 112 MMOL/L
CO2 SERPL-SCNC: 20 MMOL/L
COLOR: YELLOW
CREAT SERPL-MCNC: 1 MG/DL
CREAT SPEC-SCNC: 55 MG/DL
CREAT SPEC-SCNC: 55 MG/DL
CREAT/PROT UR: 0.1 RATIO
CYSTATIN C SERPL-MCNC: 1.11 MG/L
EGFRCR SERPLBLD CKD-EPI 2021: 79 ML/MIN/1.73M2
EOSINOPHIL # BLD AUTO: 0.19 K/UL
EOSINOPHIL NFR BLD AUTO: 3 %
FERRITIN SERPL-MCNC: 13 NG/ML
FOLATE SERPL-MCNC: 15.3 NG/ML
GFR/BSA.PRED SERPLBLD CYS-BASED-ARV: 64 ML/MIN/1.73M2
GLUCOSE QUALITATIVE U: NEGATIVE MG/DL
GLUCOSE SERPL-MCNC: 96 MG/DL
HCT VFR BLD CALC: 28.3 %
HGB BLD-MCNC: 9.5 G/DL
IMM GRANULOCYTES NFR BLD AUTO: 0.6 %
IRON SATN MFR SERPL: 7 %
IRON SERPL-MCNC: 19 UG/DL
KETONES URINE: NEGATIVE MG/DL
LEUKOCYTE ESTERASE URINE: NEGATIVE
LYMPHOCYTES # BLD AUTO: 1.11 K/UL
LYMPHOCYTES NFR BLD AUTO: 17.3 %
MAN DIFF?: NORMAL
MCHC RBC-ENTMCNC: 30.1 PG
MCHC RBC-ENTMCNC: 33.6 G/DL
MCV RBC AUTO: 89.6 FL
MICROALBUMIN 24H UR DL<=1MG/L-MCNC: <1.2 MG/DL
MICROALBUMIN/CREAT 24H UR-RTO: NORMAL MG/G
MONOCYTES # BLD AUTO: 0.76 K/UL
MONOCYTES NFR BLD AUTO: 11.9 %
NEUTROPHILS # BLD AUTO: 4.25 K/UL
NEUTROPHILS NFR BLD AUTO: 66.4 %
NITRITE URINE: NEGATIVE
PH URINE: 5.5
PLATELET # BLD AUTO: 228 K/UL
POTASSIUM SERPL-SCNC: 4.2 MMOL/L
PROT UR-MCNC: 5 MG/DL
PROTEIN URINE: NEGATIVE MG/DL
RBC # BLD: 3.16 M/UL
RBC # FLD: 14.1 %
SODIUM SERPL-SCNC: 143 MMOL/L
SPECIFIC GRAVITY URINE: 1.01
TIBC SERPL-MCNC: 275 UG/DL
TRANSFERRIN SERPL-MCNC: 230 MG/DL
UIBC SERPL-MCNC: 255 UG/DL
UROBILINOGEN URINE: 0.2 MG/DL
VIT B12 SERPL-MCNC: 404 PG/ML
WBC # FLD AUTO: 6.4 K/UL

## 2025-03-20 RX ORDER — FERROUS SULFATE TAB EC 325 MG (65 MG FE EQUIVALENT) 325 (65 FE) MG
325 (65 FE) TABLET DELAYED RESPONSE ORAL DAILY
Qty: 90 | Refills: 3 | Status: ACTIVE | COMMUNITY
Start: 2025-03-20 | End: 1900-01-01

## 2025-03-25 ENCOUNTER — TRANSCRIPTION ENCOUNTER (OUTPATIENT)
Age: 75
End: 2025-03-25

## 2025-03-25 ENCOUNTER — OUTPATIENT (OUTPATIENT)
Dept: OUTPATIENT SERVICES | Facility: HOSPITAL | Age: 75
LOS: 1 days | End: 2025-03-25
Payer: MEDICARE

## 2025-03-25 ENCOUNTER — APPOINTMENT (OUTPATIENT)
Dept: ORTHOPEDIC SURGERY | Facility: HOSPITAL | Age: 75
End: 2025-03-25

## 2025-03-25 VITALS
DIASTOLIC BLOOD PRESSURE: 70 MMHG | SYSTOLIC BLOOD PRESSURE: 126 MMHG | OXYGEN SATURATION: 98 % | TEMPERATURE: 98 F | RESPIRATION RATE: 14 BRPM | HEART RATE: 80 BPM

## 2025-03-25 VITALS
WEIGHT: 205.69 LBS | SYSTOLIC BLOOD PRESSURE: 134 MMHG | OXYGEN SATURATION: 98 % | DIASTOLIC BLOOD PRESSURE: 74 MMHG | TEMPERATURE: 97 F | HEART RATE: 86 BPM | HEIGHT: 68 IN | RESPIRATION RATE: 15 BRPM

## 2025-03-25 DIAGNOSIS — Z90.89 ACQUIRED ABSENCE OF OTHER ORGANS: Chronic | ICD-10-CM

## 2025-03-25 DIAGNOSIS — G56.01 CARPAL TUNNEL SYNDROME, RIGHT UPPER LIMB: ICD-10-CM

## 2025-03-25 DIAGNOSIS — Z98.890 OTHER SPECIFIED POSTPROCEDURAL STATES: Chronic | ICD-10-CM

## 2025-03-25 DIAGNOSIS — Z01.818 ENCOUNTER FOR OTHER PREPROCEDURAL EXAMINATION: ICD-10-CM

## 2025-03-25 DIAGNOSIS — Z98.1 ARTHRODESIS STATUS: Chronic | ICD-10-CM

## 2025-03-25 PROCEDURE — 29848 WRIST ENDOSCOPY/SURGERY: CPT | Mod: RT

## 2025-03-25 RX ORDER — IBUPROFEN 200 MG
1 TABLET ORAL
Refills: 0 | DISCHARGE

## 2025-03-25 RX ORDER — CEFAZOLIN SODIUM IN 0.9 % NACL 3 G/100 ML
2000 INTRAVENOUS SOLUTION, PIGGYBACK (ML) INTRAVENOUS ONCE
Refills: 0 | Status: COMPLETED | OUTPATIENT
Start: 2025-03-25 | End: 2025-03-25

## 2025-03-25 RX ORDER — HYDROMORPHONE/SOD CHLOR,ISO/PF 2 MG/10 ML
0.2 SYRINGE (ML) INJECTION
Refills: 0 | Status: DISCONTINUED | OUTPATIENT
Start: 2025-03-25 | End: 2025-03-25

## 2025-03-25 RX ORDER — APREPITANT 40 MG/1
40 CAPSULE ORAL ONCE
Refills: 0 | Status: COMPLETED | OUTPATIENT
Start: 2025-03-25 | End: 2025-03-25

## 2025-03-25 RX ORDER — SODIUM CHLORIDE 9 G/1000ML
1000 INJECTION, SOLUTION INTRAVENOUS
Refills: 0 | Status: DISCONTINUED | OUTPATIENT
Start: 2025-03-25 | End: 2025-03-25

## 2025-03-25 RX ORDER — IBUPROFEN 200 MG
1 TABLET ORAL
Qty: 10 | Refills: 0
Start: 2025-03-25

## 2025-03-25 RX ORDER — ONDANSETRON HCL/PF 4 MG/2 ML
4 VIAL (ML) INJECTION ONCE
Refills: 0 | Status: DISCONTINUED | OUTPATIENT
Start: 2025-03-25 | End: 2025-03-25

## 2025-03-25 RX ORDER — HYDROMORPHONE/SOD CHLOR,ISO/PF 2 MG/10 ML
0.5 SYRINGE (ML) INJECTION
Refills: 0 | Status: DISCONTINUED | OUTPATIENT
Start: 2025-03-25 | End: 2025-03-25

## 2025-03-25 RX ADMIN — Medication 1 APPLICATION(S): at 09:57

## 2025-03-25 RX ADMIN — SODIUM CHLORIDE 75 MILLILITER(S): 9 INJECTION, SOLUTION INTRAVENOUS at 12:35

## 2025-03-25 RX ADMIN — APREPITANT 40 MILLIGRAM(S): 40 CAPSULE ORAL at 09:57

## 2025-03-25 NOTE — ASU DISCHARGE PLAN (ADULT/PEDIATRIC) - NS MD DC FALL RISK RISK
For information on Fall & Injury Prevention, visit: [email or web address] OR  [email or web address] OR  https://www.cdc.gov/steadi/patient.html

## 2025-03-25 NOTE — BRIEF OPERATIVE NOTE - NSICDXBRIEFPROCEDURE_GEN_ALL_CORE_FT
PROCEDURES:  Carpal tunnel release 25-Mar-2025 12:21:37 Left Endoscopic Naseem Francois   PROCEDURES:  Carpal tunnel release 25-Mar-2025 12:21:37 Right Endoscopic Naseem Francois

## 2025-03-25 NOTE — BRIEF OPERATIVE NOTE - NSICDXBRIEFPREOP_GEN_ALL_CORE_FT
PRE-OP DIAGNOSIS:  Carpal tunnel syndrome 25-Mar-2025 12:21:44 Left Naseem Francois   PRE-OP DIAGNOSIS:  Carpal tunnel syndrome 25-Mar-2025 12:21:44 Right Naseem Francois

## 2025-03-25 NOTE — ASU PATIENT PROFILE, ADULT - FALL HARM RISK - UNIVERSAL INTERVENTIONS
Bed in lowest position, wheels locked, appropriate side rails in place/Call bell, personal items and telephone in reach/Instruct patient to call for assistance before getting out of bed or chair/Non-slip footwear when patient is out of bed/[place] to call system/Physically safe environment - no spills, clutter or unnecessary equipment/Purposeful Proactive Rounding/Room/bathroom lighting operational, light cord in reach

## 2025-03-25 NOTE — ASU DISCHARGE PLAN (ADULT/PEDIATRIC) - CARE PROVIDER_API CALL
Edgar Brock)  [facility]  [address], [address]  [place] [address]  Phone: [phone]  Fax: [phone]  Scheduled Appointment: 03/28/2025

## 2025-03-25 NOTE — BRIEF OPERATIVE NOTE - NSICDXBRIEFPOSTOP_GEN_ALL_CORE_FT
POST-OP DIAGNOSIS:  Carpal tunnel syndrome 25-Mar-2025 12:24:47 Left Naseem Francois   POST-OP DIAGNOSIS:  Carpal tunnel syndrome 25-Mar-2025 12:24:47 Right Naseem Francois

## 2025-03-25 NOTE — ASU DISCHARGE PLAN (ADULT/PEDIATRIC) - ASU DC SPECIAL INSTRUCTIONSFT
Follow all verbal and written instructions. Take medications as prescribed. DO NOT drive, operate machinery, and/or make important decisions while on prescription pain medication. DO NOT hesitate to call Doctor's office with questions or concerns.     - Call your doctor if you experience:  • An increase in pain not controlled by pain medication or change in activity or  position.  • Temperature greater than 101° F.  • Redness, increased swelling or foul smelling drainage from or around the  incision.  • Numbness, tingling or a change in color or temperature of the operative extremity.  • Call your doctor immediately if you experience chest pain, shortness of breath or calf pain.

## 2025-03-25 NOTE — BRIEF OPERATIVE NOTE - ANESTHESIOLOGIST NAME
Pt is at baseline as per family  Pt thinks that she is 24 and the she lives with her parents who are in their 47 s       Zane Lover, MARGARETTE  08/03/19 [id number]

## 2025-03-26 LAB
ALBUMIN MFR SERPL ELPH: 59.6 %
ALBUMIN SERPL-MCNC: 3.5 G/DL
ALBUMIN/GLOB SERPL: 1.5 RATIO
ALPHA1 GLOB MFR SERPL ELPH: 5.4 %
ALPHA1 GLOB SERPL ELPH-MCNC: 0.3 G/DL
ALPHA2 GLOB MFR SERPL ELPH: 11.9 %
ALPHA2 GLOB SERPL ELPH-MCNC: 0.7 G/DL
B-GLOBULIN MFR SERPL ELPH: 12 %
B-GLOBULIN SERPL ELPH-MCNC: 0.7 G/DL
GAMMA GLOB FLD ELPH-MCNC: 0.7 G/DL
GAMMA GLOB MFR SERPL ELPH: 11.1 %
INTERPRETATION SERPL IEP-IMP: NORMAL
PROT SERPL-MCNC: 5.9 G/DL
PROT SERPL-MCNC: 5.9 G/DL

## 2025-03-28 ENCOUNTER — APPOINTMENT (OUTPATIENT)
Dept: ORTHOPEDIC SURGERY | Facility: CLINIC | Age: 75
End: 2025-03-28
Payer: MEDICARE

## 2025-03-28 DIAGNOSIS — G56.01 CARPAL TUNNEL SYNDROME, RIGHT UPPER LIMB: ICD-10-CM

## 2025-03-28 PROCEDURE — 99024 POSTOP FOLLOW-UP VISIT: CPT

## 2025-04-01 ENCOUNTER — NON-APPOINTMENT (OUTPATIENT)
Age: 75
End: 2025-04-01

## 2025-04-03 ENCOUNTER — RX RENEWAL (OUTPATIENT)
Age: 75
End: 2025-04-03

## 2025-04-22 ENCOUNTER — NON-APPOINTMENT (OUTPATIENT)
Age: 75
End: 2025-04-22

## 2025-04-29 ENCOUNTER — NON-APPOINTMENT (OUTPATIENT)
Age: 75
End: 2025-04-29

## 2025-05-02 ENCOUNTER — APPOINTMENT (OUTPATIENT)
Dept: INTERNAL MEDICINE | Facility: CLINIC | Age: 75
End: 2025-05-02

## 2025-05-02 ENCOUNTER — OUTPATIENT (OUTPATIENT)
Dept: OUTPATIENT SERVICES | Facility: HOSPITAL | Age: 75
LOS: 1 days | End: 2025-05-02

## 2025-05-02 ENCOUNTER — APPOINTMENT (OUTPATIENT)
Dept: ORTHOPEDIC SURGERY | Facility: CLINIC | Age: 75
End: 2025-05-02
Payer: MEDICARE

## 2025-05-02 VITALS
DIASTOLIC BLOOD PRESSURE: 72 MMHG | WEIGHT: 200 LBS | OXYGEN SATURATION: 97 % | HEIGHT: 69 IN | SYSTOLIC BLOOD PRESSURE: 124 MMHG | RESPIRATION RATE: 16 BRPM | BODY MASS INDEX: 29.62 KG/M2 | HEART RATE: 90 BPM

## 2025-05-02 DIAGNOSIS — M48.07 SPINAL STENOSIS, LUMBOSACRAL REGION: ICD-10-CM

## 2025-05-02 DIAGNOSIS — G56.01 CARPAL TUNNEL SYNDROME, RIGHT UPPER LIMB: ICD-10-CM

## 2025-05-02 DIAGNOSIS — Z98.890 OTHER SPECIFIED POSTPROCEDURAL STATES: Chronic | ICD-10-CM

## 2025-05-02 DIAGNOSIS — D50.9 IRON DEFICIENCY ANEMIA, UNSPECIFIED: ICD-10-CM

## 2025-05-02 DIAGNOSIS — Z90.89 ACQUIRED ABSENCE OF OTHER ORGANS: Chronic | ICD-10-CM

## 2025-05-02 DIAGNOSIS — Z98.1 ARTHRODESIS STATUS: Chronic | ICD-10-CM

## 2025-05-02 DIAGNOSIS — I10 ESSENTIAL (PRIMARY) HYPERTENSION: ICD-10-CM

## 2025-05-02 DIAGNOSIS — G56.02 CARPAL TUNNEL SYNDROME, LEFT UPPER LIMB: ICD-10-CM

## 2025-05-02 PROCEDURE — 99024 POSTOP FOLLOW-UP VISIT: CPT

## 2025-05-02 PROCEDURE — 99214 OFFICE O/P EST MOD 30 MIN: CPT

## 2025-05-02 PROCEDURE — G2211 COMPLEX E/M VISIT ADD ON: CPT

## 2025-05-05 DIAGNOSIS — G56.01 CARPAL TUNNEL SYNDROME, RIGHT UPPER LIMB: ICD-10-CM

## 2025-05-05 DIAGNOSIS — D50.9 IRON DEFICIENCY ANEMIA, UNSPECIFIED: ICD-10-CM

## 2025-05-05 DIAGNOSIS — M48.07 SPINAL STENOSIS, LUMBOSACRAL REGION: ICD-10-CM

## 2025-05-05 DIAGNOSIS — I10 ESSENTIAL (PRIMARY) HYPERTENSION: ICD-10-CM

## 2025-05-05 LAB
BASOPHILS # BLD AUTO: 0.07 K/UL
BASOPHILS NFR BLD AUTO: 1.1 %
EOSINOPHIL # BLD AUTO: 0.17 K/UL
EOSINOPHIL NFR BLD AUTO: 2.7 %
FERRITIN SERPL-MCNC: 51 NG/ML
HCT VFR BLD CALC: 42.3 %
HGB BLD-MCNC: 13.3 G/DL
IMM GRANULOCYTES NFR BLD AUTO: 0.3 %
IRON SATN MFR SERPL: 10 %
IRON SERPL-MCNC: 36 UG/DL
LYMPHOCYTES # BLD AUTO: 1.61 K/UL
LYMPHOCYTES NFR BLD AUTO: 25.8 %
MAN DIFF?: NORMAL
MCHC RBC-ENTMCNC: 28.4 PG
MCHC RBC-ENTMCNC: 31.4 G/DL
MCV RBC AUTO: 90.2 FL
MONOCYTES # BLD AUTO: 0.89 K/UL
MONOCYTES NFR BLD AUTO: 14.3 %
NEUTROPHILS # BLD AUTO: 3.47 K/UL
NEUTROPHILS NFR BLD AUTO: 55.8 %
PLATELET # BLD AUTO: 203 K/UL
RBC # BLD: 4.69 M/UL
RBC # FLD: 15.1 %
TIBC SERPL-MCNC: 347 UG/DL
UIBC SERPL-MCNC: 311 UG/DL
WBC # FLD AUTO: 6.23 K/UL

## 2025-05-12 DIAGNOSIS — M47.816 SPONDYLOSIS W/OUT MYELOPATHY OR RADICULOPATHY, LUMBAR REGION: ICD-10-CM

## 2025-05-12 RX ORDER — MELOXICAM 15 MG/1
15 TABLET ORAL
Refills: 0 | Status: ACTIVE | COMMUNITY
Start: 2025-05-12

## 2025-05-15 ENCOUNTER — NON-APPOINTMENT (OUTPATIENT)
Age: 75
End: 2025-05-15

## 2025-05-15 NOTE — ASU DISCHARGE PLAN (ADULT/PEDIATRIC) - ASU DC SPECIAL INSTRUCTIONSFT
Patient called stating he received call from [facility] asking when his last treatment is. Patient provided number but when  called number it was disconnected.  updated patient and will check in at 5/15 appointment.    Instructions given in office

## 2025-05-23 ENCOUNTER — APPOINTMENT (OUTPATIENT)
Dept: ORTHOPEDIC SURGERY | Facility: CLINIC | Age: 75
End: 2025-05-23

## 2025-05-23 DIAGNOSIS — M25.832 OTHER SPECIFIED JOINT DISORDERS, LEFT WRIST: ICD-10-CM

## 2025-05-23 DIAGNOSIS — G56.02 CARPAL TUNNEL SYNDROME, LEFT UPPER LIMB: ICD-10-CM

## 2025-05-23 DIAGNOSIS — M79.642 PAIN IN LEFT HAND: ICD-10-CM

## 2025-05-23 DIAGNOSIS — M25.831 OTHER SPECIFIED JOINT DISORDERS, RIGHT WRIST: ICD-10-CM

## 2025-05-23 DIAGNOSIS — G56.01 CARPAL TUNNEL SYNDROME, RIGHT UPPER LIMB: ICD-10-CM

## 2025-05-23 PROCEDURE — 73110 X-RAY EXAM OF WRIST: CPT | Mod: LT

## 2025-05-23 PROCEDURE — 73130 X-RAY EXAM OF HAND: CPT | Mod: LT

## 2025-05-23 PROCEDURE — 99214 OFFICE O/P EST MOD 30 MIN: CPT | Mod: 24

## 2025-06-05 PROBLEM — M65.932 EXTENSOR TENOSYNOVITIS OF LEFT WRIST: Status: ACTIVE | Noted: 2025-06-05

## 2025-06-06 ENCOUNTER — APPOINTMENT (OUTPATIENT)
Dept: ORTHOPEDIC SURGERY | Facility: CLINIC | Age: 75
End: 2025-06-06

## 2025-06-06 DIAGNOSIS — M65.932 UNSPECIFIED SYNOVITIS AND TENOSYNOVITIS, LEFT FOREARM: ICD-10-CM

## 2025-06-06 DIAGNOSIS — G56.01 CARPAL TUNNEL SYNDROME, RIGHT UPPER LIMB: ICD-10-CM

## 2025-06-06 DIAGNOSIS — G56.02 CARPAL TUNNEL SYNDROME, LEFT UPPER LIMB: ICD-10-CM

## 2025-06-06 PROCEDURE — 99214 OFFICE O/P EST MOD 30 MIN: CPT | Mod: 25

## 2025-06-06 PROCEDURE — 20606 DRAIN/INJ JOINT/BURSA W/US: CPT | Mod: 58,RT

## 2025-06-06 RX ORDER — METHYLPREDNISOLONE 4 MG/1
4 TABLET ORAL
Qty: 21 | Refills: 0 | Status: ACTIVE | COMMUNITY
Start: 2025-06-06 | End: 1900-01-01

## 2025-06-06 RX ORDER — LIDOCAINE HCL/PF 10 MG/ML
1 VIAL (ML) INJECTION
Refills: 0 | Status: COMPLETED | OUTPATIENT
Start: 2025-06-06

## 2025-06-06 RX ORDER — METHYLPRED ACET/NACL,ISO-OS/PF 40 MG/ML
40 VIAL (ML) INJECTION
Refills: 0 | Status: COMPLETED | OUTPATIENT
Start: 2025-06-06

## 2025-06-06 RX ADMIN — Medication 1 %: at 00:00

## 2025-06-06 RX ADMIN — METHYLPREDNISOLONE ACETATE MG/ML: 40 INJECTION, SUSPENSION INTRA-ARTICULAR; INTRALESIONAL; INTRAMUSCULAR; SOFT TISSUE at 00:00

## 2025-06-17 ENCOUNTER — NON-APPOINTMENT (OUTPATIENT)
Age: 75
End: 2025-06-17

## 2025-06-23 ENCOUNTER — NON-APPOINTMENT (OUTPATIENT)
Age: 75
End: 2025-06-23

## 2025-06-27 ENCOUNTER — APPOINTMENT (OUTPATIENT)
Dept: ORTHOPEDIC SURGERY | Facility: CLINIC | Age: 75
End: 2025-06-27

## 2025-08-18 ENCOUNTER — RX RENEWAL (OUTPATIENT)
Age: 75
End: 2025-08-18

## 2025-09-01 ENCOUNTER — RX RENEWAL (OUTPATIENT)
Age: 75
End: 2025-09-01

## 2025-09-02 ENCOUNTER — RX RENEWAL (OUTPATIENT)
Age: 75
End: 2025-09-02